# Patient Record
Sex: FEMALE | URBAN - METROPOLITAN AREA
[De-identification: names, ages, dates, MRNs, and addresses within clinical notes are randomized per-mention and may not be internally consistent; named-entity substitution may affect disease eponyms.]

---

## 2017-11-18 ENCOUNTER — HOSPITAL ENCOUNTER (EMERGENCY)
Facility: CLINIC | Age: 37
Discharge: HOME | End: 2017-11-18
Attending: EMERGENCY MEDICINE | Admitting: EMERGENCY MEDICINE
Payer: COMMERCIAL

## 2017-11-18 VITALS
OXYGEN SATURATION: 100 % | BODY MASS INDEX: 29.15 KG/M2 | SYSTOLIC BLOOD PRESSURE: 94 MMHG | TEMPERATURE: 99.2 F | RESPIRATION RATE: 14 BRPM | DIASTOLIC BLOOD PRESSURE: 59 MMHG | WEIGHT: 159.4 LBS

## 2017-11-18 DIAGNOSIS — E86.0 DEHYDRATION: ICD-10-CM

## 2017-11-18 DIAGNOSIS — R11.10 VOMITING AND DIARRHEA: ICD-10-CM

## 2017-11-18 DIAGNOSIS — R19.7 VOMITING AND DIARRHEA: ICD-10-CM

## 2017-11-18 LAB
ALBUMIN SERPL-MCNC: 4 G/DL (ref 3.4–5)
ALP SERPL-CCNC: 107 U/L (ref 40–150)
ALT SERPL W P-5'-P-CCNC: 18 U/L (ref 0–50)
ANION GAP SERPL CALCULATED.3IONS-SCNC: 8 MMOL/L (ref 3–14)
AST SERPL W P-5'-P-CCNC: 14 U/L (ref 0–45)
BASOPHILS # BLD AUTO: 0 10E9/L (ref 0–0.2)
BASOPHILS NFR BLD AUTO: 0.1 %
BILIRUB SERPL-MCNC: 0.6 MG/DL (ref 0.2–1.3)
BUN SERPL-MCNC: 7 MG/DL (ref 7–30)
C DIFF TOX B STL QL: NEGATIVE
CALCIUM SERPL-MCNC: 8.4 MG/DL (ref 8.5–10.1)
CHLORIDE SERPL-SCNC: 106 MMOL/L (ref 94–109)
CO2 SERPL-SCNC: 24 MMOL/L (ref 20–32)
CREAT SERPL-MCNC: 0.76 MG/DL (ref 0.52–1.04)
DIFFERENTIAL METHOD BLD: NORMAL
EOSINOPHIL # BLD AUTO: 0.1 10E9/L (ref 0–0.7)
EOSINOPHIL NFR BLD AUTO: 1.2 %
ERYTHROCYTE [DISTWIDTH] IN BLOOD BY AUTOMATED COUNT: 13.7 % (ref 10–15)
GFR SERPL CREATININE-BSD FRML MDRD: 86 ML/MIN/1.7M2
GLUCOSE SERPL-MCNC: 93 MG/DL (ref 70–99)
HCG SERPL QL: NEGATIVE
HCT VFR BLD AUTO: 40 % (ref 35–47)
HGB BLD-MCNC: 13.3 G/DL (ref 11.7–15.7)
IMM GRANULOCYTES # BLD: 0 10E9/L (ref 0–0.4)
IMM GRANULOCYTES NFR BLD: 0.3 %
LYMPHOCYTES # BLD AUTO: 0.9 10E9/L (ref 0.8–5.3)
LYMPHOCYTES NFR BLD AUTO: 12.4 %
MCH RBC QN AUTO: 27.1 PG (ref 26.5–33)
MCHC RBC AUTO-ENTMCNC: 33.3 G/DL (ref 31.5–36.5)
MCV RBC AUTO: 82 FL (ref 78–100)
MONOCYTES # BLD AUTO: 0.7 10E9/L (ref 0–1.3)
MONOCYTES NFR BLD AUTO: 9.1 %
NEUTROPHILS # BLD AUTO: 5.8 10E9/L (ref 1.6–8.3)
NEUTROPHILS NFR BLD AUTO: 76.9 %
PLATELET # BLD AUTO: 268 10E9/L (ref 150–450)
POTASSIUM SERPL-SCNC: 4.1 MMOL/L (ref 3.4–5.3)
PROT SERPL-MCNC: 7.4 G/DL (ref 6.8–8.8)
RBC # BLD AUTO: 4.91 10E12/L (ref 3.8–5.2)
SODIUM SERPL-SCNC: 138 MMOL/L (ref 133–144)
SPECIMEN SOURCE: NORMAL
WBC # BLD AUTO: 7.6 10E9/L (ref 4–11)

## 2017-11-18 PROCEDURE — 84703 CHORIONIC GONADOTROPIN ASSAY: CPT | Performed by: EMERGENCY MEDICINE

## 2017-11-18 PROCEDURE — 87493 C DIFF AMPLIFIED PROBE: CPT | Mod: XU | Performed by: EMERGENCY MEDICINE

## 2017-11-18 PROCEDURE — 96375 TX/PRO/DX INJ NEW DRUG ADDON: CPT

## 2017-11-18 PROCEDURE — 87506 IADNA-DNA/RNA PROBE TQ 6-11: CPT | Performed by: EMERGENCY MEDICINE

## 2017-11-18 PROCEDURE — 96374 THER/PROPH/DIAG INJ IV PUSH: CPT

## 2017-11-18 PROCEDURE — 85025 COMPLETE CBC W/AUTO DIFF WBC: CPT | Performed by: EMERGENCY MEDICINE

## 2017-11-18 PROCEDURE — 96361 HYDRATE IV INFUSION ADD-ON: CPT

## 2017-11-18 PROCEDURE — 25000128 H RX IP 250 OP 636: Performed by: EMERGENCY MEDICINE

## 2017-11-18 PROCEDURE — 99284 EMERGENCY DEPT VISIT MOD MDM: CPT | Mod: 25

## 2017-11-18 PROCEDURE — 80053 COMPREHEN METABOLIC PANEL: CPT | Performed by: EMERGENCY MEDICINE

## 2017-11-18 RX ORDER — KETOROLAC TROMETHAMINE 30 MG/ML
30 INJECTION, SOLUTION INTRAMUSCULAR; INTRAVENOUS ONCE
Status: COMPLETED | OUTPATIENT
Start: 2017-11-18 | End: 2017-11-18

## 2017-11-18 RX ORDER — ONDANSETRON 2 MG/ML
4 INJECTION INTRAMUSCULAR; INTRAVENOUS ONCE
Status: COMPLETED | OUTPATIENT
Start: 2017-11-18 | End: 2017-11-18

## 2017-11-18 RX ORDER — ONDANSETRON 4 MG/1
4 TABLET, ORALLY DISINTEGRATING ORAL EVERY 4 HOURS PRN
Qty: 10 TABLET | Refills: 0 | Status: SHIPPED | OUTPATIENT
Start: 2017-11-18

## 2017-11-18 RX ADMIN — ONDANSETRON 4 MG: 2 SOLUTION INTRAMUSCULAR; INTRAVENOUS at 15:56

## 2017-11-18 RX ADMIN — SODIUM CHLORIDE 1000 ML: 9 INJECTION, SOLUTION INTRAVENOUS at 16:44

## 2017-11-18 RX ADMIN — SODIUM CHLORIDE 1000 ML: 9 INJECTION, SOLUTION INTRAVENOUS at 15:56

## 2017-11-18 RX ADMIN — KETOROLAC TROMETHAMINE 30 MG: 30 INJECTION, SOLUTION INTRAMUSCULAR at 17:26

## 2017-11-18 ASSESSMENT — ENCOUNTER SYMPTOMS
HEADACHES: 1
VOMITING: 1
COUGH: 0
WEAKNESS: 1
NAUSEA: 1
DIARRHEA: 1
FEVER: 0

## 2017-11-18 NOTE — LETTER
November 18, 2017      To Whom It May Concern:      Nurys Soria was seen in our Emergency Department today, 11/18/17.  I expect her condition to improve over the next 3 days.  She may return to work/school when improved.  Please excuse her from work for the next 3 days.    Sincerely,        Inez Nicole MD

## 2017-11-18 NOTE — ED PROVIDER NOTES
History     Chief Complaint:    Nausea, Vomiting, & Diarrhea       HPI   Nurys Soria is a 37 year old female who presents to the ED today with nausea, vomiting, and diarrhea. The patient states that she started vomiting and having diarrhea yesterday afternoon. She reports that in the span of an hour, she had 8 episodes of diarrhea, and states that her last episode was about a half an hour ago. She states that the last time she vomited was last night. The patient denies taking any antibiotics, any recent travels, or having any concern for possible food poisoning. She states that she works at a , where some of her co-workers have been sick with similar symptoms. The patient reports that she also has a mild headache and generalized body aches, but denies any neck pain or stiffness, cough or cold symptoms, fever/chills, bloody emesis, or blood in her stool.     Allergies:  No known drug allergies.      Medications:    Iron supplement    Past Medical History:    Anemia     Past Surgical History:    Gyn surgery- Tubal ligation     Family History:    History reviewed. No pertinent family history.     Social History:  Marital Status:    Presents to the ED with daughter   Tobacco Use: never smoker   Alcohol Use: no  [facility]     Review of Systems   Constitutional: Negative for fever.   Respiratory: Negative for cough.    Gastrointestinal: Positive for diarrhea, nausea and vomiting.   Neurological: Positive for weakness and headaches.   All other systems reviewed and are negative.    Physical Exam   First Vitals:  BP: 105/61 (MAP 75)  Heart Rate: 92  Temp: 99.2  F (37.3  C)  Resp: 14  Weight: 72.3 kg (159 lb 6.4 oz) (Actual, standing)  SpO2: 99 %      Physical Exam  Nursing note and vitals reviewed.  Constitutional:  Appears well-developed and well-nourished.   HENT:   Head:    Atraumatic.   Mouth/Throat:   Oropharynx is clear but mucous membranes are dry. No  oropharyngeal exudate.   Eyes:    Pupils are equal, round, and reactive to light.   Neck:    Normal range of motion. Neck supple.      No tracheal deviation present. No thyromegaly present. No meningeal signs. She can touch her chin to her chest comfortably.   Cardiovascular:  Normal rate, regular rhythm, no murmur   Pulmonary/Chest: Breath sounds are clear and equal without wheezes or crackles.  Abdominal:   Soft. Bowel sounds are normal. Exhibits no distension and      no mass. There is no tenderness.      There is no rebound and no guarding.   Back:    No CVA tenderness.   Musculoskeletal:  Exhibits no edema.   Lymphadenopathy:  No cervical adenopathy.   Neurological:   Alert and oriented to person, place, and time. GCS 15.  CN 2-12 intact.  and proximal upper extremity strength strong and equal.  Bilateral lower extremity strength strong and equal, including strong dorsiflexion and plantarflexion strength.  Sensation intact and equal to the face, arms and legs.  No facial droop or weakness. Normal speech.  Follows commands and answers questions normally.    Skin:    Skin is warm and dry. No rash noted. No pallor.      Emergency Department Course   Laboratory:  HCG Qualitative Pregnancy (1550): Negative.   CBC:  WBC 7.6, HGB 13.3, , otherwise WNL   CMP: calcium 8.4 (L) otherwise WNL (Creatinine 0.76)   Clostridium difficile toxin B PCR: pending   Enteric bacteria and virus panel by SHELBY stool: pending     Interventions:  (1556) Normal Saline, 1 liter, IV bolus   (1556) Zofran 4 mg, IV   (1644) Normal Saline, 1 liter, IV bolus   (1736) Toradol 30 mg, IV    Emergency Department Course:  Nursing notes and vitals reviewed.  (1540) I performed an exam of the patient as documented above.    A peripheral IV was established. Blood was drawn from the patient. This was sent for laboratory testing, findings above.   Stool sample was obtained and sent for laboratory analysis, findings above.    Findings and plan  explained to the patient. Patient discharged home with instructions regarding supportive care, medications, and reasons to return. The importance of close follow-up was reviewed. The patient was prescribed Zofran.    Impression & Plan    Medical Decision Making:  Nurys Soria is a 37 year old female who presents for evaluation of nausea, vomiting and diarrhea.  There are some ill contacts at the  that she works at.  I considered a broad differential diagnosis for this patient including viral gastroenteritis, bacterial infection of the large intestine (salmonella, shigella, campylobacter, e coli, etc), bowel obstruction, intra-abdominal infection such as colitis, food poisoning, cholecystitis, UTI, pyelonephritis, appendicitis, etc.  She has a mild headache associated with the body aches, but no signs of meningitis.  There are no signs of worrisome intra-abdominal pathologies detected during the visit today.  She has a completely benign abdominal exam without rebound, guarding, or marked tenderness to palpation.  Supportive outpatient management is therefore indicated.  Abdominal pain precautions are given for home.   No indication for stool studies at this time.  No indication for CT at this time.  She passed oral challenge here in ED. It was discussed to return to the ED for blood in stool, increasing pain, or fevers more than 102.   Feels much improved after interventions in ED.      Diagnosis:    ICD-10-CM    1. Vomiting and diarrhea R11.10     R19.7    2. Dehydration E86.0        Disposition:  discharged to home    Discharge Medications:  New Prescriptions    ONDANSETRON (ZOFRAN ODT) 4 MG ODT TAB    Take 1 tablet (4 mg) by mouth every 4 hours as needed for nausea         Nayla NICOLE, am serving as a scribe on 11/18/2017 at 3:40 PM to personally document services performed by Dr. Nicole based on my observations and the provider's statements to me.   11/18/2017    EMERGENCY DEPARTMENT        Inez Nicole MD  11/18/17 3160

## 2017-11-18 NOTE — ED AVS SNAPSHOT
Emergency Department    [address] [facility] [address]    Phone:  [phone]    Fax:  [phone]                                       Nurys Soria   MRN: [id number]    Department:   Emergency Department   Date of Visit:  11/18/2017           Patient Information     Date Of Birth          1980        Your diagnoses for this visit were:     Vomiting and diarrhea     Dehydration        You were seen by Inez Nicole MD.      Follow-up Information     Follow up with Clinic, [facility].    Why:  Monday for recheck    Contact information:    [address]  [facility] [address] [phone]          Follow up with  Emergency Department.    Specialty:  EMERGENCY MEDICINE    Why:  As needed, If symptoms worsen or fevers or abdominal pain or recurrent vomiting.    Contact information:    [address] [facility] [address] [phone]      Discharge References/Attachments     DEHYDRATION (Togolese)    DEHYDRATION (ADULT) (Togolese)    VOMITING AND DIARRHEA, NONSPECIFIC (ADULT) (Togolese)    VOMITING OR DIARRHEA (ADULT), DIET FOR (Togolese)      24 Hour Appointment Hotline       To make an appointment at any [facility], call [phone] ([phone]). If you don't have a family doctor or clinic, we will help you find one. [facility] clinics are conveniently located to serve the needs of you and your family.             Review of your medicines      START taking        Dose / Directions Last dose taken    ondansetron 4 MG ODT tab   Commonly known as:  ZOFRAN ODT   Dose:  4 mg   Quantity:  10 tablet        Take 1 tablet (4 mg) by mouth every 4 hours as needed for nausea   Refills:  0          Our records show that you are taking the medicines listed below. If these are incorrect, please call your family doctor or clinic.        Dose / Directions Last dose taken    IRON SUPPLEMENT PO        Refills:  0        VITAMIN D  (CHOLECALCIFEROL) PO        Take by mouth daily   Refills:  0                Prescriptions were sent or printed at these locations (1 Prescription)                   Other Prescriptions                Printed at Department/Unit printer (1 of 1)         ondansetron (ZOFRAN ODT) 4 MG ODT tab                Procedures and tests performed during your visit     CBC with platelets + differential    Clostridium difficile toxin B PCR    Comprehensive metabolic panel    Enteric Bacteria and Virus Panel by SHELBY Stool    HCG QUALitative pregnancy (blood)      Orders Needing Specimen Collection     None      Pending Results     Date and Time Order Name Status Description    11/18/2017 1554 Clostridium difficile toxin B PCR In process     11/18/2017 1554 Enteric Bacteria and Virus Panel by SHELBY Stool In process             Pending Culture Results     Date and Time Order Name Status Description    11/18/2017 1554 Clostridium difficile toxin B PCR In process     11/18/2017 1554 Enteric Bacteria and Virus Panel by SHELBY Stool In process             Pending Results Instructions     If you had any lab results that were not finalized at the time of your Discharge, you can call the ED Lab Result RN at [phone]. You will be contacted by this team for any positive Lab results or changes in treatment. The nurses are available 7 days a week from 10A to 6:30P.  You can leave a message 24 hours per day and they will return your call.        Test Results From Your Hospital Stay        11/18/2017  3:59 PM      Component Results     Component Value Ref Range & Units Status    WBC 7.6 4.0 - 11.0 10e9/L Final    RBC Count 4.91 3.8 - 5.2 10e12/L Final    Hemoglobin 13.3 11.7 - 15.7 g/dL Final    Hematocrit 40.0 35.0 - 47.0 % Final    MCV 82 78 - 100 fl Final    MCH 27.1 26.5 - 33.0 pg Final    MCHC 33.3 31.5 - 36.5 g/dL Final    RDW 13.7 10.0 - 15.0 % Final    Platelet Count 268 150 - 450 10e9/L Final    Diff Method Automated Method  Final    %  Neutrophils 76.9 % Final    % Lymphocytes 12.4 % Final    % Monocytes 9.1 % Final    % Eosinophils 1.2 % Final    % Basophils 0.1 % Final    % Immature Granulocytes 0.3 % Final    Absolute Neutrophil 5.8 1.6 - 8.3 10e9/L Final    Absolute Lymphocytes 0.9 0.8 - 5.3 10e9/L Final    Absolute Monocytes 0.7 0.0 - 1.3 10e9/L Final    Absolute Eosinophils 0.1 0.0 - 0.7 10e9/L Final    Absolute Basophils 0.0 0.0 - 0.2 10e9/L Final    Abs Immature Granulocytes 0.0 0 - 0.4 10e9/L Final         11/18/2017  4:14 PM      Component Results     Component Value Ref Range & Units Status    Sodium 138 133 - 144 mmol/L Final    Potassium 4.1 3.4 - 5.3 mmol/L Final    Chloride 106 94 - 109 mmol/L Final    Carbon Dioxide 24 20 - 32 mmol/L Final    Anion Gap 8 3 - 14 mmol/L Final    Glucose 93 70 - 99 mg/dL Final    Urea Nitrogen 7 7 - 30 mg/dL Final    Creatinine 0.76 0.52 - 1.04 mg/dL Final    GFR Estimate 86 >60 mL/min/1.7m2 Final    Non  GFR Calc    GFR Estimate If Black >90 >60 mL/min/1.7m2 Final    African American GFR Calc    Calcium 8.4 (L) 8.5 - 10.1 mg/dL Final    Bilirubin Total 0.6 0.2 - 1.3 mg/dL Final    Albumin 4.0 3.4 - 5.0 g/dL Final    Protein Total 7.4 6.8 - 8.8 g/dL Final    Alkaline Phosphatase 107 40 - 150 U/L Final    ALT 18 0 - 50 U/L Final    AST 14 0 - 45 U/L Final         11/18/2017  4:11 PM      Component Results     Component Value Ref Range & Units Status    HCG Qualitative Serum Negative NEG^Negative Final    This test is for screening purposes.  Results should be interpreted along with   the clinical picture.  Confirmation testing is available if warranted by   ordering [id number], HCG Quantitative Pregnancy.           11/18/2017  4:14 PM         11/18/2017  4:14 PM                Clinical Quality Measure: Blood Pressure Screening     Your blood pressure was checked while you were in the emergency department today. The last reading we obtained was  BP: 90/64 . Please read the guidelines below  "about what these numbers mean and what you should do about them.  If your systolic blood pressure (the top number) is less than 120 and your diastolic blood pressure (the bottom number) is less than 80, then your blood pressure is normal. There is nothing more that you need to do about it.  If your systolic blood pressure (the top number) is 120-139 or your diastolic blood pressure (the bottom number) is 80-89, your blood pressure may be higher than it should be. You should have your blood pressure rechecked within a year by a primary care provider.  If your systolic blood pressure (the top number) is 140 or greater or your diastolic blood pressure (the bottom number) is 90 or greater, you may have high blood pressure. High blood pressure is treatable, but if left untreated over time it can put you at risk for heart attack, stroke, or kidney failure. You should have your blood pressure rechecked by a primary care provider within the next 4 weeks.  If your provider in the emergency department today gave you specific instructions to follow-up with your doctor or provider even sooner than that, you should follow that instruction and not wait for up to 4 weeks for your follow-up visit.        Thank you for choosing [facility]       Thank you for choosing [facility] for your care. Our goal is always to provide you with excellent care. Hearing back from our patients is one way we can continue to improve our services. Please take a few minutes to complete the written survey that you may receive in the mail after you visit with us. Thank you!        [employer] Information     [email or web address] lets you send messages to your doctor, view your test results, renew your prescriptions, schedule appointments and more. To sign up, go to [email or web address] . Click on \"Log in\" on the left side of the screen, which will take you to the Welcome page. Then click on \"Sign up Now\" on the right side of the page.     You will be asked to enter the " access code listed below, as well as some personal information. Please follow the directions to create your username and password.     Your access code is: [email or web address]  Expires: 2018  5:41 PM     Your access code will  in 90 days. If you need help or a new code, please call your [facility] clinic or [phone].        Care EveryWhere ID     This is your Care EveryWhere ID. This could be used by other organizations to access your [facility] medical records  [id number]        Equal Access to Services     [facility] : Ari Yuan, josephine penn, diamond coffeyalsalome beasley, venkatesh jaimes . So [facility] [phone].    ATENCIÓN: Si habla español, tiene a thompson disposición servicios gratuitos de asistencia lingüística. Llame al [phone].    We comply with applicable federal civil rights laws and [place] laws. We do not discriminate on the basis of race, color, national origin, age, disability, sex, sexual orientation, or gender identity.            After Visit Summary       This is your record. Keep this with you and show to your community pharmacist(s) and doctor(s) at your next visit.

## 2017-11-19 ENCOUNTER — TELEPHONE (OUTPATIENT)
Dept: EMERGENCY MEDICINE | Facility: CLINIC | Age: 37
End: 2017-11-19

## 2017-11-19 LAB
C COLI+JEJUNI+LARI FUSA STL QL NAA+PROBE: NOT DETECTED
EC STX1 GENE STL QL NAA+PROBE: NOT DETECTED
EC STX2 GENE STL QL NAA+PROBE: NOT DETECTED
ENTERIC PATHOGEN COMMENT: ABNORMAL
NOROV GI+II ORF1-ORF2 JNC STL QL NAA+PR: ABNORMAL
RVA NSP5 STL QL NAA+PROBE: NOT DETECTED
SALMONELLA SP RPOD STL QL NAA+PROBE: NOT DETECTED
SHIGELLA SP+EIEC IPAH STL QL NAA+PROBE: NOT DETECTED
V CHOL+PARA RFBL+TRKH+TNAA STL QL NAA+PR: NOT DETECTED
Y ENTERO RECN STL QL NAA+PROBE: NOT DETECTED

## 2017-11-19 NOTE — TELEPHONE ENCOUNTER
[facility] Emergency Department Lab result notification:    [place] ED lab result protocol used  Enteric Bacteria and Virus Panel: Norovirus    Reason for call  Notify of lab results, assess symptoms,  review ED providers recommendations/discharge instructions (if necessary) and advise per ED lab result f/u protocol    Lab Result  Final Enteric Bacteria and Virus Panel by SHELBY Stool is POSITIVE for Norovirus I and II by SHELBY  Patient to be notified, symptom's assessed and advised per [place] ED lab result f/u protocol.  Information table from ED Provider visit on 11/18/2017  Symptoms reported at ED visit (Chief complaint, HPI) a 37 year old female who presents to the ED today with nausea, vomiting, and diarrhea. The patient states that she started vomiting and having diarrhea yesterday afternoon. She reports that in the span of an hour, she had 8 episodes of diarrhea, and states that her last episode was about a half an hour ago. She states that the last time she vomited was last night. The patient denies taking any antibiotics, any recent travels, or having any concern for possible food poisoning. She states that she works at a , where some of her co-workers have been sick with similar symptoms. The patient reports that she also has a mild headache and generalized body aches, but denies any neck pain or stiffness, cough or cold symptoms, fever/chills, bloody emesis, or blood in her stool   ED providers Impression and Plan (applicable information) a 37 year old female who presents for evaluation of nausea, vomiting and diarrhea.  There are some ill contacts at the  that she works at.  I considered a broad differential diagnosis for this patient including viral gastroenteritis, bacterial infection of the large intestine (salmonella, shigella, campylobacter, e coli, etc), bowel obstruction, intra-abdominal infection such as colitis, food poisoning, cholecystitis, UTI, pyelonephritis,  appendicitis, etc.  She has a mild headache associated with the body aches, but no signs of meningitis.  There are no signs of worrisome intra-abdominal pathologies detected during the visit today.  She has a completely benign abdominal exam without rebound, guarding, or marked tenderness to palpation.  Supportive outpatient management is therefore indicated.  Abdominal pain precautions are given for home.   No indication for stool studies at this time.  No indication for CT at this time.  She passed oral challenge here in ED. It was discussed to return to the ED for blood in stool, increasing pain, or fevers more than 102.   Feels much improved after interventions in ED.    Miscellaneous information Follow up with Clinic, [facility]. Monday for a recheck     RN Assessment (Patient s current Symptoms), include time called.  [Insert Left message here if message left]  At 1114 Left voicemail message requesting a call back to [phone] between 10 a.m. and 6:30 p.m., 7 days a week for patient's ED/UC lab results.  May leave a message 24/7, if no one available.     Lizet Lloyd, RN  [place] [employer] [facility] RN  Lung Nodule and ED Lab Result F/u RN  Epic pool (ED late result f/u RN): [id number]  FV INCIDENTAL RADIOLOGY F/U NURSES: P [id number]  # [phone]      Copy of Lab result   Exam Information   Exam Date Exam Time Accession # Results    11/18/17  4:00 PM [id number]    Component Results   Component Value Flag Ref Range Units Status Collected Lab   Campylobacter group by SHELBY Not Detected  NDET^Not Detected  Final 11/18/2017  4:00 PM 75   Salmonella species by SHELBY Not Detected  NDET^Not Detected  Final 11/18/2017  4:00 PM 75   Shigella species by SHELBY Not Detected  NDET^Not Detected  Final 11/18/2017  4:00 PM 75   Vibrio group by SHELBY Not Detected  NDET^Not Detected  Final 11/18/2017  4:00 PM 75   Rotavirus A by SHELBY Not Detected  NDET^Not Detected  Final 11/18/2017  4:00 PM 75   Shiga toxin 1  gene by SHELBY Not Detected  NDET^Not Detected  Final 11/18/2017  4:00 PM 75   Shiga toxin 2 gene by SHELBY Not Detected  NDET^Not Detected  Final 11/18/2017  4:00 PM 75   Norovirus I and II by SHELBY  (A) NDET^Not Detected  Final 11/18/2017  4:00 PM 75   Detected, Abnormal Result   Yersinia enterocolitica by SHELBY Not Detected  NDET^Not Detected  Final 11/18/2017  4:00 PM 75   Enteric pathogen comment     Final 11/18/2017  4:00 PM 75

## 2017-11-20 NOTE — TELEPHONE ENCOUNTER
"[facility] Emergency Department Lab result notification     Patient/parent Name  Nurys    RN Assessment (Patient s current Symptoms), include time called.  [Insert Left message here if message left]  \"No more diarrhea.  I'm still having some stomach pain but better.  Still a little nauseous.  I'm seeing my doctor on Wednesday.\"    RN Recommendations/Instructions per [place] ED lab result protocol  Reviewed information of Norovirus per protocol.  Encouraged handwashing and maintaining hydration     Please Contact your PCP clinic or return to the Emergency department if your:    Symptoms worsen or other concerning symptom's.    PCP follow-up Questions asked: YES       [RN Name]  Tre Gillis RN  [facility] RN  Lung Nodule and ED Lab Result F/u RN  Epic pool (ED late result f/u RN): [id number]  FV INCIDENTAL RADIOLOGY F/U NURSES: P [id number]  # [phone]    "

## 2018-03-23 ENCOUNTER — HOSPITAL ENCOUNTER (EMERGENCY)
Facility: CLINIC | Age: 38
Discharge: HOME | End: 2018-03-24
Attending: EMERGENCY MEDICINE | Admitting: EMERGENCY MEDICINE
Payer: COMMERCIAL

## 2018-03-23 VITALS
TEMPERATURE: 97.9 F | SYSTOLIC BLOOD PRESSURE: 107 MMHG | HEART RATE: 69 BPM | OXYGEN SATURATION: 100 % | RESPIRATION RATE: 16 BRPM | DIASTOLIC BLOOD PRESSURE: 38 MMHG

## 2018-03-23 DIAGNOSIS — M79.602 LEFT ARM PAIN: ICD-10-CM

## 2018-03-23 PROCEDURE — 99282 EMERGENCY DEPT VISIT SF MDM: CPT

## 2018-03-23 NOTE — ED AVS SNAPSHOT
Emergency Department    [address] [place] [place] [address]    Phone:  [phone]    Fax:  [phone]                                       Nurys Soria   MRN: [id number]    Department:   Emergency Department   Date of Visit:  3/23/2018           Patient Information     Date Of Birth          1980        Your diagnoses for this visit were:     Left arm pain        You were seen by Dago Ryan MD.      Follow-up Information     Follow up with Clinic, [facility]. Schedule an appointment as soon as possible for a visit in 1 week.    Why:  For follow up    Contact information:    [address]  [facility] [address]  [phone]          Discharge Instructions         Espasmo Muscular [Muscle Spasm]  Un ESPASMO MUSCULAR (MUSCLE SPASM) es ange contracción prolongada de las fibras musculares. Puede deberse a un esfuerzo o agotamiento del músculo, a ange lesión o cambios metabólicos. Si persiste zachary cierto tiempo, el espasmo muscular causa dolor. Los espasmos musculares (muscle spasm) suelen presentarse en las piernas (en especial, por la noche en las personas mayores), en el shireen y en la espalda.  Cuidados En [place]:  1) Calor, masajes y estiramiento (elongación) pasivo le ayudarán a relajar el espasmo muscular.  2) Si el espasmo es en el brazo o la pierna, puede estirar el músculo de manera pasiva : pida a alguien que le doble o le enderece la articulación por encima o por debajo del músculo afectado hasta que sienta que ése músculo se estira. Mantenga micah tensión zachary 5 a 30 segundos, mientras le resulte tolerable. Suelte. Descanse un minuto. Repita hasta aliviar el espasmo.  3) Puede usar acetaminofén [acetaminophen] (Tylenol) o ibuprofeno [ibuprofen] (Motrin o Advil) para controlar el dolor, a menos que le hayan recetado otro medicamento. [ NOTA : Si tiene ange enfermedad hepática o renal crónica (chronic liver or kidney disease), o ha tenido  alguna vez ange úlcera estomacal (stomach ulcer) o sangrado gastrointestinal (GI bleeding), consulte con thompson médico antes de zohreh estos medicamentos.]  Programe ange VISITA DE CONTROL con thompson médico o adamaris centro si no empieza a sentirse mejor zachary los próximos 1 ó 2 días.  Busque Prontamente Atención Médica  si algo de lo siguiente ocurre:  -- Se le hinchan los dedos de los pies o las maria r, o los siente fríos o entumecidos, se gordo azulados, o siente cosquilleo en micah claudia.  -- Siente debilidad o entumecimiento en el brazo o la pierna afectados.  -- El dolor aumenta y no disminuye con las medidas detalladas más arriba.  Date Last Reviewed: 11/21/2015 2000-2017 [employer]. [address] [place] [address]. Todos los derechos reservados. Esta información no pretende sustituir la atención médica profesional. Sólo thompson médico puede diagnosticar y tratar un problema de vicky.          24 Hour Appointment Hotline       To make an appointment at any [facility] clinic, call [phone] ([phone]). If you don't have a family doctor or clinic, we will help you find one. [facility] clinics are conveniently located to serve the needs of you and your family.             Review of your medicines      START taking        Dose / Directions Last dose taken    cyclobenzaprine 10 MG tablet   Commonly known as:  FLEXERIL   Dose:  5-10 mg   Quantity:  10 tablet        Take 0.5-1 tablets (5-10 mg) by mouth 3 times daily as needed for muscle spasms (May cause drowsiness.)   Refills:  0        naproxen 500 MG tablet   Commonly known as:  NAPROSYN   Dose:  500 mg   Quantity:  16 tablet        Take 1 tablet (500 mg) by mouth 2 times daily (with meals) for 8 days   Refills:  0          Our records show that you are taking the medicines listed below. If these are incorrect, please call your family doctor or clinic.        Dose / Directions Last dose taken    IRON SUPPLEMENT PO        Refills:  0         ondansetron 4 MG ODT tab   Commonly known as:  ZOFRAN ODT   Dose:  4 mg   Quantity:  10 tablet        Take 1 tablet (4 mg) by mouth every 4 hours as needed for nausea   Refills:  0        VITAMIN D (CHOLECALCIFEROL) PO        Take by mouth daily   Refills:  0                Prescriptions were sent or printed at these locations (2 Prescriptions)                   Other Prescriptions                Printed at Department/Unit printer (2 of 2)         naproxen (NAPROSYN) 500 MG tablet               cyclobenzaprine (FLEXERIL) 10 MG tablet                Orders Needing Specimen Collection     None      Pending Results     No orders found for last 3 day(s).            Pending Culture Results     No orders found for last 3 day(s).            Pending Results Instructions     If you had any lab results that were not finalized at the time of your Discharge, you can call the ED Lab Result RN at [phone]. You will be contacted by this team for any positive Lab results or changes in treatment. The nurses are available 7 days a week from 10A to 6:30P.  You can leave a message 24 hours per day and they will return your call.        Test Results From Your Hospital Stay               Clinical Quality Measure: Blood Pressure Screening     Your blood pressure was checked while you were in the emergency department today. The last reading we obtained was  BP: (!) 107/38 . Please read the guidelines below about what these numbers mean and what you should do about them.  If your systolic blood pressure (the top number) is less than 120 and your diastolic blood pressure (the bottom number) is less than 80, then your blood pressure is normal. There is nothing more that you need to do about it.  If your systolic blood pressure (the top number) is 120-139 or your diastolic blood pressure (the bottom number) is 80-89, your blood pressure may be higher than it should be. You should have your blood pressure rechecked within a year by a  "primary care provider.  If your systolic blood pressure (the top number) is 140 or greater or your diastolic blood pressure (the bottom number) is 90 or greater, you may have high blood pressure. High blood pressure is treatable, but if left untreated over time it can put you at risk for heart attack, stroke, or kidney failure. You should have your blood pressure rechecked by a primary care provider within the next 4 weeks.  If your provider in the emergency department today gave you specific instructions to follow-up with your doctor or provider even sooner than that, you should follow that instruction and not wait for up to 4 weeks for your follow-up visit.        Thank you for choosing [facility]       Thank you for choosing [facility] for your care. Our goal is always to provide you with excellent care. Hearing back from our patients is one way we can continue to improve our services. Please take a few minutes to complete the written survey that you may receive in the mail after you visit with us. Thank you!        [facility] Information     [facility] lets you send messages to your doctor, view your test results, renew your prescriptions, schedule appointments and more. To sign up, go to [email or web address] . Click on \"Log in\" on the left side of the screen, which will take you to the Welcome page. Then click on \"Sign up Now\" on the right side of the page.     You will be asked to enter the access code listed below, as well as some personal information. Please follow the directions to create your username and password.     Your access code is: [email or web address]  Expires: 2018 12:31 AM     Your access code will  in 90 days. If you need help or a new code, please call your [facility] clinic or [phone].        Care EveryWhere ID     This is your Care EveryWhere ID. This could be used by other organizations to access your [facility] medical records  [id number]        Equal Access to Services     ABDULKADIR LEAVITT AH: " Hadii cameron Yuan, warhiannada isamar, qamilita kaalvenkatesh gill. So [facility] [phone].    ATENCIÓN: Si habla español, tiene a thompson disposición servicios gratuitos de asistencia lingüística. Llame al [phone].    We comply with applicable federal civil rights laws and [place] laws. We do not discriminate on the basis of race, color, national origin, age, disability, sex, sexual orientation, or gender identity.            After Visit Summary       This is your record. Keep this with you and show to your community pharmacist(s) and doctor(s) at your next visit.

## 2018-03-23 NOTE — ED AVS SNAPSHOT
Emergency Department    [address] [place] [address]    Phone:  [phone]    Fax:  [phone]                                       Nurys Soria   MRN: [id number]    Department:   Emergency Department   Date of Visit:  3/23/2018           After Visit Summary Signature Page     I have received my discharge instructions, and my questions have been answered. I have discussed any challenges I see with this plan with the nurse or doctor.    ..........................................................................................................................................  Patient/Patient Representative Signature      ..........................................................................................................................................  Patient Representative Print Name and Relationship to Patient    ..................................................               ................................................  Date                                            Time    ..........................................................................................................................................  Reviewed by Signature/Title    ...................................................              ..............................................  Date                                                            Time

## 2018-03-24 RX ORDER — CYCLOBENZAPRINE HCL 10 MG
5-10 TABLET ORAL 3 TIMES DAILY PRN
Qty: 10 TABLET | Refills: 0 | Status: SHIPPED | OUTPATIENT
Start: 2018-03-24

## 2018-03-24 RX ORDER — NAPROXEN 500 MG/1
500 TABLET ORAL 2 TIMES DAILY WITH MEALS
Qty: 16 TABLET | Refills: 0 | Status: SHIPPED | OUTPATIENT
Start: 2018-03-24 | End: 2018-04-01

## 2018-03-24 ASSESSMENT — ENCOUNTER SYMPTOMS
MYALGIAS: 1
NUMBNESS: 1
ARTHRALGIAS: 1
NECK PAIN: 0
WEAKNESS: 0

## 2018-03-24 NOTE — DISCHARGE INSTRUCTIONS
Espasmo Muscular [Muscle Spasm]  Un ESPASMO MUSCULAR (MUSCLE SPASM) es ange contracción prolongada de las fibras musculares. Puede deberse a un esfuerzo o agotamiento del músculo, a ange lesión o cambios metabólicos. Si persiste zachary cierto tiempo, el espasmo muscular causa dolor. Los espasmos musculares (muscle spasm) suelen presentarse en las piernas (en especial, por la noche en las personas mayores), en el shireen y en la espalda.  Cuidados En [place]:  1) Calor, masajes y estiramiento (elongación) pasivo le ayudarán a relajar el espasmo muscular.  2) Si el espasmo es en el brazo o la pierna, puede estirar el músculo de manera pasiva : pida a alguien que le doble o le enderece la articulación por encima o por debajo del músculo afectado hasta que sienta que ése músculo se estira. Mantenga micah tensión zachary 5 a 30 segundos, mientras le resulte tolerable. Suelte. Descanse un minuto. Repita hasta aliviar el espasmo.  3) Puede usar acetaminofén [acetaminophen] (Tylenol) o ibuprofeno [ibuprofen] (Motrin o Advil) para controlar el dolor, a menos que le hayan recetado otro medicamento. [ NOTA : Si tiene ange enfermedad hepática o renal crónica (chronic liver or kidney disease), o ha tenido alguna vez ange úlcera estomacal (stomach ulcer) o sangrado gastrointestinal (GI bleeding), consulte con thompson médico antes de zohreh estos medicamentos.]  Programe ange VISITA DE CONTROL con thompson médico o adamaris centro si no empieza a sentirse mejor zachary los próximos 1 ó 2 días.  Busque Prontamente Atención Médica  si algo de lo siguiente ocurre:  -- Se le hinchan los dedos de los pies o las maria r, o los siente fríos o entumecidos, se gordo azulados, o siente cosquilleo en micah claudia.  -- Siente debilidad o entumecimiento en el brazo o la pierna afectados.  -- El dolor aumenta y no disminuye con las medidas detalladas más arriba.  Date Last Reviewed: 11/21/2015 2000-2017 [employer]. [address] [place] [address], [place]  [address]. Todos los derechos reservados. Esta información no pretende sustituir la atención médica profesional. Sólo thompson médico puede diagnosticar y tratar un problema de vicky.

## 2018-03-24 NOTE — ED PROVIDER NOTES
History     Chief Complaint:  Arm Pain    History limited due to language barrier and subsequently provided by  device.    DONOVAN Soria is a right handed 37 year old female who presents to the emergency department today for evaluation of left arm pain. The patient reports an onset of intermittent burning left arm pain radiating her shoulder to her wrist for the past month with tingling in her fingers. She initially thought this pain was from sleeping on her arm. She has never felt a pain like this before. The pain has been increasingly worse with more constant pain. Additionally, she notes increasing cramping in her feet. She is concerned about the length of her pain prompting her visit to the emergency department. At arrival, she rates her pain at 8/10. She denies injury to her neck, arm, or shoulder, neck pain, weakness, rash, and pain medication use. 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 Patient denies numbness or weakness in the hand, arm or fingers. She denies shooting or radiating pain from the shoulder. She denies difficulty moving the arm or increased pain with movement. Denies fevers or recent illness. Her primary care doctor is Dr. More Cody.    Allergies:  No Known Drug Allergies    Medications:    VITAMIN D, CHOLECALCIFEROL, PO  Ferrous Sulfate (IRON SUPPLEMENT PO)  ondansetron (ZOFRAN ODT) 4 MG ODT tab    Past Medical History:    Migraines    Past Surgical History:    Tubal ligation  Cholecystectomy    Family History:    History reviewed. No pertinent family history.     Social History:  The patient was accompanied to the ED by family.  Smoking Status: Never  Smokeless Tobacco: Never  Alcohol Use: No  Marital Status:      Review of Systems   Musculoskeletal: Positive for arthralgias (left arm) and myalgias (left arm, cramping in feet). Negative for neck pain.   Skin: Negative for rash.   Neurological: Positive for numbness  (left fingers). Negative for weakness.   All other systems reviewed and are negative.    Physical Exam   First Vitals:  BP: (!) 107/38  Pulse: 69  Temp: 97.9  F (36.6  C)  Resp: 16  SpO2: 100 %    Physical Exam  General: Well appearing, nontoxic. Resting comfortably  Head:  Scalp, face, and head appear normal  Eyes:  Pupils equal, round, and reactive to light    Conjunctivae noninjected and sclera white  ENT:    The nose is normal    Ears/pinnae are normal  Neck:  Normal range of motion. Cervical spine nontender, no stepoffs. No neck soft tissue tenderness or lesions.  CV:  RRR, no M/R/G  Resp:  CTAB, no increased WOB  MSK:  LUE appears normal. No evidence for injury or trauma. No rash or lesions. Strength 5 out of 5 and intact in all distributions of the bilateral upper extremities.  Sensation intact to light touch throughout all peripheral distributions.  Finger strength is intact to AB and adduction as well as opposition.  Patient notes mild tenderness to palpation of the forearm muscles however they are soft there is no evidence of increased compartment pressures in the arm.  The joints of the arm have full range of motion both active and passive without any swelling erythema or warmth.  There is no focal bony tenderness or deformity to the arm shoulder or clavicles.  Radial pulses 2+ and symmetric bilaterally.  Capillary refill is less than 2 seconds in bilateral fingertips.  Spurling's test is negative bilaterally.  Skin:  No rash or lesions noted.  Neuro: Speech is normal and fluent    Moves all extremities spontaneously  Psych:  Awake, Alert. Normal affect      Appropriate interactions           Emergency Department Course   Emergency Department Course:  Nursing notes and vitals reviewed.  0003: I performed an exam of the patient as documented above.   Findings and plan explained to the Patient. Patient discharged home with instructions regarding supportive care, medications, and reasons to return. The  importance of close follow-up was reviewed. The patient was prescribed Flexeril and Naprosyn.  I personally answered all related questions with the patient and the family prior to discharge.    Impression & Plan    Medical Decision Making:  Nurys Soria is a 37 year old female presents with left arm pain. The symptoms are fairly atypical. She reports no injury or trauma. She reports no muscle tension, shoulder pain or neck pain. She does note intermittent cramping in her feet as well. The pain in her arm is intermittent though worse today. There is no evidence of cellulitis, septic arthritis, or other infection. She has normal sensation, circulation, and motor function in the upper extremities. Spurling test was negative for any worsening of radicular pain or radicular symptoms. The exact etiology of the patient's symptoms is unclear, although it does not appear to be related to a serious neurologic problem, infection, or injury. At this point, I will treat her symptomatically with NSAIDs as well as muscle relaxer medications. She may have irritation of the brachial plexus at the shoulder as on exam, she does have some increased muscle tension in this area. There is no evidence of compartment syndrome or deep space infection. Patient denies any history that would suggest a repetitive use injury, cubital tunnel or carpal tunnel syndrome. I encouraged the patient to follow up closely with her primary care provider if her symptoms continue as she may need physical therapy or further evaluation by orthopedics or hand specialists. Patient was agreeable to plan of care. All questions were answered. Return precautions were provided and patient was discharged in stable condition with prescription for Naproxen and Flexeril.     Diagnosis:    ICD-10-CM    1. Left arm pain M79.602        Disposition:  discharged to home    Discharge Medications:  New Prescriptions    CYCLOBENZAPRINE (FLEXERIL) 10 MG TABLET    Take 0.5-1  tablets (5-10 mg) by mouth 3 times daily as needed for muscle spasms (May cause drowsiness.)    NAPROXEN (NAPROSYN) 500 MG TABLET    Take 1 tablet (500 mg) by mouth 2 times daily (with meals) for 8 days       Scribe Disclosure:  Daljit NICOLE, am serving as a scribe at 11:50 PM on 3/23/2018 to document services personally performed by Dago Ryan MD based on my observations and the provider's statements to me.     3/23/2018    EMERGENCY DEPARTMENT       Dago Ryan MD  03/24/18 1953

## 2022-06-07 NOTE — ED AVS SNAPSHOT
Emergency Department    [address] [place] [address]    Phone:  [phone]    Fax:  [phone]                                       Nurys Soria   MRN: [id number]    Department:   Emergency Department   Date of Visit:  11/18/2017           After Visit Summary Signature Page     I have received my discharge instructions, and my questions have been answered. I have discussed any challenges I see with this plan with the nurse or doctor.    ..........................................................................................................................................  Patient/Patient Representative Signature      ..........................................................................................................................................  Patient Representative Print Name and Relationship to Patient    ..................................................               ................................................  Date                                            Time    ..........................................................................................................................................  Reviewed by Signature/Title    ...................................................              ..............................................  Date                                                            Time           PAST MEDICAL HISTORY:  DM (diabetes mellitus)     No Past Medical History     Splenic infarct

## 2024-12-04 ENCOUNTER — OFFICE VISIT (OUTPATIENT)
Dept: URGENT CARE | Facility: URGENT CARE | Age: 44
End: 2024-12-04
Payer: COMMERCIAL

## 2024-12-04 VITALS
HEART RATE: 92 BPM | BODY MASS INDEX: 31.46 KG/M2 | TEMPERATURE: 98.5 F | DIASTOLIC BLOOD PRESSURE: 66 MMHG | SYSTOLIC BLOOD PRESSURE: 108 MMHG | OXYGEN SATURATION: 98 % | WEIGHT: 172 LBS

## 2024-12-04 DIAGNOSIS — R10.9 FLANK PAIN: Primary | ICD-10-CM

## 2024-12-04 DIAGNOSIS — B34.9 VIRAL ILLNESS: ICD-10-CM

## 2024-12-04 DIAGNOSIS — N30.00 ACUTE CYSTITIS WITHOUT HEMATURIA: ICD-10-CM

## 2024-12-04 DIAGNOSIS — R11.0 NAUSEA: ICD-10-CM

## 2024-12-04 LAB
ALBUMIN UR-MCNC: NEGATIVE MG/DL
AMORPH CRY #/AREA URNS HPF: ABNORMAL /HPF
APPEARANCE UR: ABNORMAL
BACTERIA #/AREA URNS HPF: ABNORMAL /HPF
BILIRUB UR QL STRIP: NEGATIVE
COLOR UR AUTO: YELLOW
FLUAV AG SPEC QL IA: NEGATIVE
FLUBV AG SPEC QL IA: NEGATIVE
GLUCOSE UR STRIP-MCNC: NEGATIVE MG/DL
HGB UR QL STRIP: NEGATIVE
KETONES UR STRIP-MCNC: NEGATIVE MG/DL
LEUKOCYTE ESTERASE UR QL STRIP: ABNORMAL
NITRATE UR QL: POSITIVE
PH UR STRIP: 8 [PH] (ref 5–7)
RBC #/AREA URNS AUTO: ABNORMAL /HPF
SARS-COV-2 RNA RESP QL NAA+PROBE: NEGATIVE
SP GR UR STRIP: 1.02 (ref 1–1.03)
SQUAMOUS #/AREA URNS AUTO: ABNORMAL /LPF
UROBILINOGEN UR STRIP-ACNC: 0.2 E.U./DL
WBC #/AREA URNS AUTO: ABNORMAL /HPF

## 2024-12-04 PROCEDURE — 87635 SARS-COV-2 COVID-19 AMP PRB: CPT | Performed by: PHYSICIAN ASSISTANT

## 2024-12-04 PROCEDURE — 99204 OFFICE O/P NEW MOD 45 MIN: CPT | Performed by: PHYSICIAN ASSISTANT

## 2024-12-04 PROCEDURE — 81001 URINALYSIS AUTO W/SCOPE: CPT | Performed by: PHYSICIAN ASSISTANT

## 2024-12-04 PROCEDURE — 87804 INFLUENZA ASSAY W/OPTIC: CPT | Performed by: PHYSICIAN ASSISTANT

## 2024-12-04 RX ORDER — ONDANSETRON 8 MG/1
8 TABLET, ORALLY DISINTEGRATING ORAL EVERY 8 HOURS PRN
Qty: 21 TABLET | Refills: 0 | Status: SHIPPED | OUTPATIENT
Start: 2024-12-04

## 2024-12-04 RX ORDER — CEFDINIR 300 MG/1
300 CAPSULE ORAL 2 TIMES DAILY
Qty: 14 CAPSULE | Refills: 0 | Status: SHIPPED | OUTPATIENT
Start: 2024-12-04 | End: 2024-12-11

## 2024-12-04 NOTE — PROGRESS NOTES
Flank pain  - UA Macroscopic with reflex to Microscopic and Culture - Clinic Collect  - Urine Microscopic Exam  - Urine Culture    Viral illness  - Influenza A & B Antigen - Clinic Collect  - COVID-19 Virus (Coronavirus) by PCR Nasopharyngeal      General Tips for All Ages:    Rest and Hydration:  Allow yourself the time to rest and prioritize hydration.  Stay well-hydrated with water, clear broths, and soothing herbal teas.    Symptomatic Relief:  Over-the-counter (OTC) medications can help alleviate symptoms.  Consider non-pharmacological options like a warm saltwater gargle for soothing a sore throat.    For Infants and Children (Under 2 Years):    Nasal Saline Drops:  Use saline drops to clear nasal congestion in infants.  Administer 1-2 drops in each nostril before feeding or bedtime.    Humidifier:  Place a cool-mist humidifier in the room to ease congestion.  Remember to clean the humidifier regularly.  Okay to use [employer]     Acetaminophen (if applicable):  Use acetaminophen for fever and discomfort.  Follow dosing guidelines based on your child's weight.  Avoid aspirin in children to prevent Reye's syndrome.    Contact Pediatrician:  If symptoms persist or worsen, or if your child shows signs of respiratory distress, contact your pediatrician.    For Children (2-12 Years):    Nasal Saline Solution:  Encourage the use of saline nasal spray for congestion relief.  Teach your child to blow their nose gently.    Honey (for those over 1 year):  Use honey to soothe a cough (1-2 teaspoons as needed).  Do not give honey to children under 1 year due to the risk of botulism.    Acetaminophen or Ibuprofen:  Use acetaminophen or ibuprofen for fever and pain relief.  Follow dosing guidelines based on your child's weight.    Fluid Intake:  Ensure your child drinks warm liquids like herbal teas and broths.  Monitor their fluid intake to keep them hydrated.    For Adolescents and Adults (12 Years and  Older):    Decongestants (Pseudoephedrine/Phenylephrine):  Consider OTC decongestants for nasal congestion.  Use with caution if you have hypertension, and avoid prolonged use.    Cough Suppressants (Dextromethorphan):  Choose a cough suppressant for persistent cough.  Avoid in children under 12 years; use honey instead.  Follow package instructions and avoid multiple medications with similar ingredients.    Pain Relievers (Acetaminophen or Ibuprofen):  Use acetaminophen or ibuprofen for pain and fever.  Follow package instructions.  Take ibuprofen with food to minimize stomach upset.    Rest and Isolation:  Prioritize rest and stay at home to prevent spreading the infection.    For All Ages:    Hydration:  Ensure you stay well-hydrated; monitor urine color to gauge hydration.    Hand Hygiene:  Wash hands with soap and water for at least 20 seconds.  Use hand  with at least 60% alcohol if soap is unavailable.    Avoid Tobacco Smoke:  Stay away from tobacco smoke, which can worsen respiratory symptoms.    Seek Medical Attention:  If symptoms worsen or if you experience difficulty breathing, seek medical attention promptly.  Look out for red flag symptoms like persistent high fever, severe headache, or chest pain.    Patient advised to return to clinic for reevaluation (either UC or PCP) if symptoms do not improve in 7 days. Patient educated on red flag symptoms and asked to go directly to the ED if these symptoms present themselves.     Remember, this guide is meant to assist you, but individual cases may vary. If you have concerns or if symptoms persist, don't hesitate to reach out to a healthcare professional. Wishing you a speedy recovery!      Acute cystitis without hematuria  - cefdinir (OMNICEF) 300 MG capsule; Take 1 capsule (300 mg) by mouth 2 times daily for 7 days.    General Tips for All Ages:    Hydration:  Why: Drinking plenty of water helps flush out bacteria from the urinary system.  What to  Do: Aim for at least 8 glasses of water per day.    Cranberry Juice:  Why: Some studies suggest cranberry juice may help prevent bacterial adherence in the urinary tract.  What to Do: Drink pure, unsweetened cranberry juice. Limit added sugars.    For Adults (18 Years and Older):    Over-the-Counter (OTC) Pain Relievers:  Why: Relieves discomfort and pain.  What to Use: Ibuprofen or acetaminophen as directed on the package.    Urinary Alkalinizers (if prescribed):  Why: Alters the acidity of the urine, providing relief.  What to Do: Take as prescribed by your healthcare provider.    Antibiotics (if prescribed):  Why: Eliminates the bacterial infection.  What to Do: Take the full course of antibiotics as directed, even if symptoms improve.    For Adolescents (12-17 Years):    OTC Pain Relievers:  Why: Manages pain and discomfort.  What to Use: Ibuprofen or acetaminophen following package instructions.    Hydration:  Why: Helps flush out bacteria.  What to Do: Drink plenty of water; avoid sugary drinks.    Seek Medical Attention:  When: If symptoms persist or worsen.  What to Do: Contact your healthcare provider for further evaluation.    For Children (Under 12 Years):    Hydration:  Why: Promotes urinary system flushing.  What to Do: Encourage your child to drink water regularly.    Avoid Irritants:  Why: Certain soaps or bubble baths can worsen symptoms.  What to Do: Use gentle, fragrance-free products for bathing.    OTC Pain Relievers (if approved by pediatrician):  Why: Eases pain and discomfort.  What to Use: Follow your pediatrician's advice on using ibuprofen or acetaminophen.    Seek Medical Attention:  When: If symptoms persist or if your child has a high fever.  What to Do: Contact your child's pediatrician for guidance.    All Ages:    Probiotics:  Why: May help restore healthy bacteria in the gut.  What to Do: Consider including probiotic-rich foods or supplements.    Avoid Irritants:  Why: Certain foods  and drinks can worsen symptoms.  What to Do: Limit caffeine, spicy foods, and alcohol during the infection.    Follow-up:    Patient advised to return to clinic for reevaluation (either UC or PCP) if symptoms do not improve in 7 days. Patient educated on red flag symptoms and asked to go directly to the ED if these symptoms present themselves.     Remember, individual cases may vary, and it's crucial to follow your healthcare provider's advice. If you have concerns or if symptoms persist, don't hesitate to reach out for further guidance.    Wishing you a armas recovery!    Nausea  - ondansetron (ZOFRAN ODT) 8 MG ODT tab; Take 1 tablet (8 mg) by mouth every 8 hours as needed for nausea.    [facility]    Subjective   44 year old who presents to clinic today for the following health issues:    Urgent Care       HPI     Acute Illness  Acute illness concerns: Headache, mid back pain nausea, and fever  Onset/Duration: Sun  Symptoms:  Fever: Feels feverish   Chills/Sweats: YES  Headache (location?): YES- Seems to be left sided and in the back of the head   Sinus Pressure: No  Conjunctivitis:  No  Ear Pain: Right sided ear pain   Rhinorrhea: YES  Congestion: No  Sore Throat: No  Cough: no  Wheeze: No  Decreased Appetite: YES  Nausea: YES  Vomiting: Monday morning   Diarrhea: No  Dysuria/Freq.: No  Dysuria or Hematuria: No  Fatigue/Achiness: Fatigue   Sick/Strep Exposure: None known   Therapies tried and outcome: Ibuprofen     Review of Systems   Review of Systems   See HPI    Objective    Temp: 98.5  F (36.9  C) Temp src: Tympanic BP: 108/66 Pulse: 92     SpO2: 98 %       Physical Exam   Physical Exam  Constitutional:       General: She is not in acute distress.     Appearance: Normal appearance. She is normal weight. She is not ill-appearing, toxic-appearing or diaphoretic.   HENT:      Head: Normocephalic and atraumatic.      Right Ear: Tympanic membrane, ear canal and external  ear normal. There is no impacted cerumen.      Left Ear: Tympanic membrane, ear canal and external ear normal. There is no impacted cerumen.      Nose: Congestion and rhinorrhea present.      Mouth/Throat:      Mouth: Mucous membranes are moist.      Pharynx: No oropharyngeal exudate or posterior oropharyngeal erythema.   Cardiovascular:      Rate and Rhythm: Normal rate and regular rhythm.      Pulses: Normal pulses.      Heart sounds: Normal heart sounds. No murmur heard.     No friction rub. No gallop.   Pulmonary:      Effort: Pulmonary effort is normal. No respiratory distress.      Breath sounds: No stridor. No wheezing, rhonchi or rales.   Chest:      Chest wall: No tenderness.   Lymphadenopathy:      Cervical: No cervical adenopathy.   Neurological:      General: No focal deficit present.      Mental Status: She is alert and oriented to person, place, and time. Mental status is at baseline.      Gait: Gait normal.   Psychiatric:         Mood and Affect: Mood normal.         Behavior: Behavior normal.         Thought Content: Thought content normal.         Judgment: Judgment normal.          Results for orders placed or performed in visit on 12/04/24 (from the past 24 hours)   Influenza A & B Antigen - Clinic Collect    Specimen: Nose; Swab   Result Value Ref Range    Influenza A antigen Negative Negative    Influenza B antigen Negative Negative    Narrative    Test results must be correlated with clinical data. If necessary, results should be confirmed by a molecular assay or viral culture.   UA Macroscopic with reflex to Microscopic and Culture - Clinic Collect    Specimen: Urine, Clean Catch   Result Value Ref Range    Color Urine Yellow Colorless, Straw, Light Yellow, Yellow    Appearance Urine Cloudy (A) Clear    Glucose Urine Negative Negative mg/dL    Bilirubin Urine Negative Negative    Ketones Urine Negative Negative mg/dL    Specific Gravity Urine 1.020 1.003 - 1.035    Blood Urine Negative  Negative    pH Urine 8.0 (H) 5.0 - 7.0    Protein Albumin Urine Negative Negative mg/dL    Urobilinogen Urine 0.2 0.2, 1.0 E.U./dL    Nitrite Urine Positive (A) Negative    Leukocyte Esterase Urine Trace (A) Negative   Urine Microscopic Exam   Result Value Ref Range    Bacteria Urine Many (A) None Seen /HPF    RBC Urine 0-2 0-2 /HPF /HPF    WBC Urine 0-5 0-5 /HPF /HPF    Squamous Epithelials Urine Many (A) None Seen /LPF    Amorphous Crystals Urine Few (A) None Seen /HPF

## 2024-12-04 NOTE — LETTER
December 4, 2024      Nurys Soria  [address]  [facility] [address]        To Whom It May Concern:    Nurys Soria  was seen on 12/4.  Please excuse her for her absence since Monday and her absence tomorrow due to illness.        Sincerely,        Holden Avila PA-C

## 2024-12-05 ENCOUNTER — HOSPITAL ENCOUNTER (EMERGENCY)
Facility: CLINIC | Age: 44
End: 2024-12-05
Attending: EMERGENCY MEDICINE
Payer: COMMERCIAL

## 2024-12-05 VITALS
HEART RATE: 95 BPM | TEMPERATURE: 98.4 F | DIASTOLIC BLOOD PRESSURE: 84 MMHG | OXYGEN SATURATION: 98 % | SYSTOLIC BLOOD PRESSURE: 118 MMHG | RESPIRATION RATE: 16 BRPM

## 2024-12-05 DIAGNOSIS — M54.81 OCCIPITAL NEURALGIA OF RIGHT SIDE: ICD-10-CM

## 2024-12-05 LAB
ALBUMIN SERPL BCG-MCNC: 4.2 G/DL (ref 3.5–5.2)
ALP SERPL-CCNC: 101 U/L (ref 40–150)
ALT SERPL W P-5'-P-CCNC: 16 U/L (ref 0–50)
ANION GAP SERPL CALCULATED.3IONS-SCNC: 10 MMOL/L (ref 7–15)
AST SERPL W P-5'-P-CCNC: 16 U/L (ref 0–45)
BACTERIA UR CULT: ABNORMAL
BASOPHILS # BLD AUTO: 0 10E3/UL (ref 0–0.2)
BASOPHILS NFR BLD AUTO: 1 %
BILIRUB SERPL-MCNC: 0.7 MG/DL
BUN SERPL-MCNC: 9 MG/DL (ref 6–20)
CALCIUM SERPL-MCNC: 9.3 MG/DL (ref 8.8–10.4)
CHLORIDE SERPL-SCNC: 103 MMOL/L (ref 98–107)
CREAT SERPL-MCNC: 0.88 MG/DL (ref 0.51–0.95)
EGFRCR SERPLBLD CKD-EPI 2021: 84 ML/MIN/1.73M2
EOSINOPHIL # BLD AUTO: 0.2 10E3/UL (ref 0–0.7)
EOSINOPHIL NFR BLD AUTO: 2 %
ERYTHROCYTE [DISTWIDTH] IN BLOOD BY AUTOMATED COUNT: 13 % (ref 10–15)
GLUCOSE SERPL-MCNC: 122 MG/DL (ref 70–99)
HCO3 SERPL-SCNC: 26 MMOL/L (ref 22–29)
HCT VFR BLD AUTO: 42.9 % (ref 35–47)
HGB BLD-MCNC: 14.4 G/DL (ref 11.7–15.7)
IMM GRANULOCYTES # BLD: 0.1 10E3/UL
IMM GRANULOCYTES NFR BLD: 1 %
LYMPHOCYTES # BLD AUTO: 1.9 10E3/UL (ref 0.8–5.3)
LYMPHOCYTES NFR BLD AUTO: 23 %
MCH RBC QN AUTO: 27.7 PG (ref 26.5–33)
MCHC RBC AUTO-ENTMCNC: 33.6 G/DL (ref 31.5–36.5)
MCV RBC AUTO: 83 FL (ref 78–100)
MONOCYTES # BLD AUTO: 0.7 10E3/UL (ref 0–1.3)
MONOCYTES NFR BLD AUTO: 9 %
NEUTROPHILS # BLD AUTO: 5.4 10E3/UL (ref 1.6–8.3)
NEUTROPHILS NFR BLD AUTO: 65 %
NRBC # BLD AUTO: 0 10E3/UL
NRBC BLD AUTO-RTO: 0 /100
PLATELET # BLD AUTO: 300 10E3/UL (ref 150–450)
POTASSIUM SERPL-SCNC: 3.8 MMOL/L (ref 3.4–5.3)
PROT SERPL-MCNC: 7.2 G/DL (ref 6.4–8.3)
RBC # BLD AUTO: 5.19 10E6/UL (ref 3.8–5.2)
SODIUM SERPL-SCNC: 139 MMOL/L (ref 135–145)
WBC # BLD AUTO: 8.3 10E3/UL (ref 4–11)

## 2024-12-05 PROCEDURE — 36415 COLL VENOUS BLD VENIPUNCTURE: CPT | Performed by: STUDENT IN AN ORGANIZED HEALTH CARE EDUCATION/TRAINING PROGRAM

## 2024-12-05 PROCEDURE — 82040 ASSAY OF SERUM ALBUMIN: CPT | Performed by: EMERGENCY MEDICINE

## 2024-12-05 PROCEDURE — 80053 COMPREHEN METABOLIC PANEL: CPT | Performed by: EMERGENCY MEDICINE

## 2024-12-05 PROCEDURE — 85004 AUTOMATED DIFF WBC COUNT: CPT | Performed by: EMERGENCY MEDICINE

## 2024-12-05 PROCEDURE — 258N000003 HC RX IP 258 OP 636: Performed by: EMERGENCY MEDICINE

## 2024-12-05 PROCEDURE — 64405 NJX AA&/STRD GR OCPL NRV: CPT

## 2024-12-05 PROCEDURE — 96374 THER/PROPH/DIAG INJ IV PUSH: CPT

## 2024-12-05 PROCEDURE — 250N000011 HC RX IP 250 OP 636: Performed by: EMERGENCY MEDICINE

## 2024-12-05 PROCEDURE — 99285 EMERGENCY DEPT VISIT HI MDM: CPT | Mod: 25

## 2024-12-05 PROCEDURE — 96375 TX/PRO/DX INJ NEW DRUG ADDON: CPT

## 2024-12-05 RX ORDER — DEXAMETHASONE SODIUM PHOSPHATE 10 MG/ML
10 INJECTION, SOLUTION INTRAMUSCULAR; INTRAVENOUS ONCE
Status: COMPLETED | OUTPATIENT
Start: 2024-12-05 | End: 2024-12-06

## 2024-12-05 RX ORDER — KETOROLAC TROMETHAMINE 15 MG/ML
15 INJECTION, SOLUTION INTRAMUSCULAR; INTRAVENOUS ONCE
Status: COMPLETED | OUTPATIENT
Start: 2024-12-05 | End: 2024-12-05

## 2024-12-05 RX ADMIN — METOCLOPRAMIDE 10 MG: 5 INJECTION, SOLUTION INTRAMUSCULAR; INTRAVENOUS at 22:17

## 2024-12-05 RX ADMIN — KETOROLAC TROMETHAMINE 15 MG: 15 INJECTION, SOLUTION INTRAMUSCULAR; INTRAVENOUS at 22:17

## 2024-12-05 ASSESSMENT — COLUMBIA-SUICIDE SEVERITY RATING SCALE - C-SSRS
6. HAVE YOU EVER DONE ANYTHING, STARTED TO DO ANYTHING, OR PREPARED TO DO ANYTHING TO END YOUR LIFE?: NO
1. IN THE PAST MONTH, HAVE YOU WISHED YOU WERE DEAD OR WISHED YOU COULD GO TO SLEEP AND NOT WAKE UP?: NO
2. HAVE YOU ACTUALLY HAD ANY THOUGHTS OF KILLING YOURSELF IN THE PAST MONTH?: NO

## 2024-12-05 ASSESSMENT — ACTIVITIES OF DAILY LIVING (ADL)
ADLS_ACUITY_SCORE: 41

## 2024-12-06 ENCOUNTER — APPOINTMENT (OUTPATIENT)
Dept: CT IMAGING | Facility: CLINIC | Age: 44
End: 2024-12-06
Attending: EMERGENCY MEDICINE
Payer: COMMERCIAL

## 2024-12-06 VITALS
HEART RATE: 95 BPM | DIASTOLIC BLOOD PRESSURE: 68 MMHG | SYSTOLIC BLOOD PRESSURE: 92 MMHG | RESPIRATION RATE: 16 BRPM | OXYGEN SATURATION: 95 % | TEMPERATURE: 98.4 F

## 2024-12-06 PROCEDURE — 250N000011 HC RX IP 250 OP 636: Performed by: EMERGENCY MEDICINE

## 2024-12-06 PROCEDURE — 70450 CT HEAD/BRAIN W/O DYE: CPT

## 2024-12-06 PROCEDURE — 96375 TX/PRO/DX INJ NEW DRUG ADDON: CPT

## 2024-12-06 RX ORDER — HALOPERIDOL 5 MG/ML
2 INJECTION INTRAMUSCULAR ONCE
Status: COMPLETED | OUTPATIENT
Start: 2024-12-06 | End: 2024-12-06

## 2024-12-06 RX ADMIN — DEXAMETHASONE SODIUM PHOSPHATE 10 MG: 10 INJECTION, SOLUTION INTRAMUSCULAR; INTRAVENOUS at 00:09

## 2024-12-06 RX ADMIN — HALOPERIDOL LACTATE 2 MG: 5 INJECTION, SOLUTION INTRAMUSCULAR at 01:14

## 2024-12-06 ASSESSMENT — ACTIVITIES OF DAILY LIVING (ADL)
ADLS_ACUITY_SCORE: 41
ADLS_ACUITY_SCORE: 41

## 2024-12-06 NOTE — ED TRIAGE NOTES
Pt arrives via triage presenting with headache for 5 days. Hx of migraines. Endorses nausea     Triage Assessment (Adult)       Row Name 12/05/24 2047          Triage Assessment    Airway WDL WDL        Respiratory WDL    Respiratory WDL WDL        Cardiac WDL    Cardiac WDL WDL        Peripheral/Neurovascular WDL    Peripheral Neurovascular WDL WDL        Cognitive/Neuro/Behavioral WDL    Cognitive/Neuro/Behavioral WDL X  headache

## 2024-12-06 NOTE — ED PROVIDER NOTES
"  Emergency Department Note      History of Present Illness     Chief Complaint   Headache      HPI   Nurys Soria is a 44 year old female with history of migraine who presents to the ED with headache.  Symptoms started 5 days ago.  This is abnormal for her, and is lasting longer than her typical migraine.  Been trying Excedrin to help with symptoms without relief.  She describes that the headache is located mostly to the right side of her head, but sometimes in the front.  It is worse when she lies on her side.  The light also makes it worse.  She states that she had a subjective fever 5 days ago.  That resolved 4 days ago.  She was seen in urgent care 2 days ago, and was diagnosed with a \"light UTI\" and was started on antibiotics.  She also had a flu and COVID test.  She was told the flu is negative, but does not know the result of her COVID test.  She is feeling nauseated, but denies any vomiting.  She denies any vision changes, focal numbness or weakness.  She does have some pain in her right upper arm, but thinks that this is because she has been laying on her arm.  She feels that this headache is different than her typical migraine because it is lasted longer and feels stronger.  She describes it as a pressure-like pain.  States that she was diagnosed with migraine about 14 years ago.    Independent Historian   None    Review of External Notes   ***    Past Medical History     Medical History and Problem List   No past medical history on file.    Medications   cefdinir (OMNICEF) 300 MG capsule  cyclobenzaprine (FLEXERIL) 10 MG tablet  Ferrous Sulfate (IRON SUPPLEMENT PO)  ondansetron (ZOFRAN ODT) 4 MG ODT tab  ondansetron (ZOFRAN ODT) 8 MG ODT tab  VITAMIN D, CHOLECALCIFEROL, PO        Surgical History   Past Surgical History:   Procedure Laterality Date    GYN SURGERY      tubaligation\       Physical Exam     Patient Vitals for the past 24 hrs:   BP Temp Pulse Resp SpO2   12/05/24 2108 -- -- -- 16 -- "   12/05/24 2048 118/84 98.4  F (36.9  C) 95 (!) 146 98 %     Physical Exam  General: alert, lying comfortably on gurney  HENT: mucous membranes moist  Neck: FROM without pain.  CV: regular rate, regular rhythm  Resp: normal effort, clear throughout, no crackles or wheezing  GI: abdomen soft and nontender, no guarding  MSK: no bony tenderness  Skin: appropriately warm and dry  Extremities: no edema, calves non-tender  Neuro: GCS 15.     Speech is fluent, cognition is normal.     EOMI, symmetric grimace.     Str 5/5 in RUE, LUE, RLE, LLE.     Fine touch sensation intact in BUE/BLE.   No pronator drift.    Psych: normal mood and affect    Diagnostics     Lab Results   Labs Ordered and Resulted from Time of ED Arrival to Time of ED Departure   COMPREHENSIVE METABOLIC PANEL - Abnormal       Result Value    Sodium 139      Potassium 3.8      Carbon Dioxide (CO2) 26      Anion Gap 10      Urea Nitrogen 9.0      Creatinine 0.88      GFR Estimate 84      Calcium 9.3      Chloride 103      Glucose 122 (*)     Alkaline Phosphatase 101      AST 16      ALT 16      Protein Total 7.2      Albumin 4.2      Bilirubin Total 0.7     CBC WITH PLATELETS AND DIFFERENTIAL    WBC Count 8.3      RBC Count 5.19      Hemoglobin 14.4      Hematocrit 42.9      MCV 83      MCH 27.7      MCHC 33.6      RDW 13.0      Platelet Count 300      % Neutrophils 65      % Lymphocytes 23      % Monocytes 9      % Eosinophils 2      % Basophils 1      % Immature Granulocytes 1      NRBCs per 100 WBC 0      Absolute Neutrophils 5.4      Absolute Lymphocytes 1.9      Absolute Monocytes 0.7      Absolute Eosinophils 0.2      Absolute Basophils 0.0      Absolute Immature Granulocytes 0.1      Absolute NRBCs 0.0         Imaging   No orders to display       EKG   ECG taken at ***, ECG read at ***  ***   *** as compared to prior, dated ***/***/***.  Rate *** bpm. WY interval *** ms. QRS duration *** ms. QT/QTc ***/*** ms. P-R-T axes *** *** ***.    Independent  "Interpretation   {IndependentReview:669836::\"None\"}    ED Course      Medications Administered   Medications - No data to display    Procedures   Procedures     Discussion of Management   {Consults/Care Discussions:943270::\"None\"}    ED Course   ED Course as of 12/05/24 2138   Thu Dec 05, 2024   2138 I obtained history and examined the patient as noted above.        Additional Documentation  {EPPAAdditionalPhrase:275240::\"None\"}    Medical Decision Making / Diagnosis     CMS Diagnoses: {Sepsis/Septic Shock/Stemi/Stroke:688980::\"None\"}    MIPS       {EPPA MIPS:645669::\"None\"}    TIKA Soria is a 44 year old female ***    Disposition   {EPPAFV Dispo:141902}    Diagnosis   No diagnosis found.     Discharge Medications   New Prescriptions    No medications on file         Scribe Disclosure:  I, William Mendieta, am serving as a scribe at 9:38 PM on 12/5/2024 to document services personally performed by Christine Arthur MD based on my observations and the provider's statements to me.     " in the distribution of the occipital nerve.  I offered and she excepted a occipital nerve block.  Fortunately, this seems to have helped her symptoms a great deal, and she describes nearly complete resolution of her symptoms.  Otherwise, workup in the ED is reassuring as well.  CT of the head was obtained when I cannot control her symptoms.  However, as noted above, I think symptoms are related to occipital neuralgia.  Labs are unremarkable.  I recommended close follow-up with PCP to discuss diagnosis further.  Return to the ED for recurrent headache, new symptoms such as confusion, focal numbness or weakness, fever, or for any other concerns.    Disposition   The patient was discharged.     Diagnosis     ICD-10-CM    1. Occipital neuralgia of right side  M54.81            Discharge Medications   New Prescriptions    No medications on file         Scribe Disclosure:  I, William Mendieta, am serving as a scribe at 9:38 PM on 12/5/2024 to document services personally performed by Christine Arthur MD based on my observations and the provider's statements to me.        Christine Arthur MD  12/07/24 0319

## 2024-12-06 NOTE — DISCHARGE INSTRUCTIONS
Neuralgia occipital   Alguna vez tuvo un dolor de vinay intenso en la parte posterior de la vinay? Podría ser un trastorno de dolor de vinay poco frecuente conocido vern neuralgia occipital. Ghazal dolor de vinay ocurre cuando el dolor proviene de la región occipital o de la parte posterior de la vinay y se extiende a través de los nervios occipitales. Los nervios occipitales se encuentran en la parte superior de la médula bates y llegan hasta el cuero cabelludo. Controlan la vinay y el shireen.    La neuralgia occipital puede ser un trastorno primario o secundario, lo que significa que puede ser ange afección en sí misma o un síntoma de aneg enfermedad subyacente.    Hay varias opciones de tratamiento disponibles para ayudarlo a controlar con éxito los síntomas y el dolor de la neuralgia occipital, especialmente si se trata la causa subyacente.    Si cyndi la neuralgia occipital no pone en peligro la franny, es dolorosa.     Cómo se siente la neuralgia occipital?  La imagen muestra la parte del cráneo que afecta un dolor de vinay por neuralgia occipital, en la parte posterior de la vinay.  El dolor de la neuralgia occipital se siente en la parte posterior de la vinay, en los nervios occipitales.  La neuralgia occipital puede sentirse vern un dolor jordy y punzante en la parte posterior de la vinay, generalmente en un lado. Puede describirse vern ange sensación punzante o de ardor. También puede causar sensibilidad a la jeet, sensibilidad en el cuero cabelludo y dolor detrás del vipul en el lado afectado. También puede sentir dolor en la base del cráneo o detrás de la oreja.    A diferencia de otros kavin de vinay o migrañas, que poco a poco se vuelven más dolorosos, la neuralgia occipital puede desencadenarse rápidamente y con algo tan simple vern cepillarse el nabil. En la neuralgia occipital, el dolor intenso es breve, dura solo unos segundos o un par de minutos y luego desaparece, mientras que el dolor de  la migraña también es intenso, gisell dura mucho más.    Es poco probable que los kavin de vinay por neuralgia occipital presenten síntomas vern párpados caídos, enrojecimiento de los ojos u ojos llorosos. Estos síntomas son comunes con otros trastornos de dolor de vinay, vern migrañas o hemicránea continua.     Qué causa la neuralgia occipital?  La neuralgia occipital comúnmente es causada por nervios comprimidos en la raíz del shireen o porque los músculos del shireen están demasiado tensos. En algunos casos, puede deberse a ange lesión en la vinay o el shireen.    Sin embargo, dado que la neuralgia occipital también se considera un trastorno de dolor de vinay secundario, puede ser un síntoma de otra afección. Las afecciones que pueden provocar o empeorar la neuralgia occipital incluyen:    Artrosis, especialmente de la columna cervical superior, que puede pinzar los nervios  Tumores que crecen en las raíces nerviosas  MAV (malformación arteriovenosa)  Aneurisma cerebral  Tumor cerebral  Meningitis  Trastornos del sueño  Ataque cerebral  Sinusitis (inflamación de los senos paranasales)  Neuralgia trigeminal  Artritis reumatoide  Gota  Infección   Cómo se diagnostica la neuralgia occipital?  El proveedor allan le hará un examen físico completo. Le preguntará sobre thompson historia clínica y cuánto tiempo ha experimentado síntomas, o si ha tenido dolor en la parte posterior de la vinay. Ange forma en que el médico puede comprobar si siente dolor es aplicando presión en la parte posterior de la vinay.    Para detectar ange causa subyacente o descartar otras afecciones, el proveedor puede pedir pruebas por imágenes, vern ange resonancia magnética o ange tomografía computarizada. Mangum le ayudará a observar mejor la columna.     Cómo se elimina el dolor de la neuralgia occipital?  En la mayoría de los casos, el médico le recomendará tratamientos caseros, vern aplicar un paño tibio en las áreas donde siente dolor y zohreh  medicamentos antiinflamatorios, vern ibuprofeno o naproxeno. Si no obtiene alivio con esos tratamientos, el médico puede recetarle relajantes musculares para aliviar la tensión muscular alrededor de los nervios.    Si estos métodos más conservadores no funcionan, el médico puede inyectarle un anestésico local en el área occipital (la parte posterior de la vinay). Ghazal tratamiento puede proporcionar un alivio inmediato y durar varias horas.    Para ciertos trastornos de dolor de vinay, vern la neuralgia occipital, un bloqueo del nervio occipital puede ofrecer un alivio temporal. Sin embargo, es importante tener en cuenta que la eficacia de los bloqueos nerviosos puede según la persona. Hable sobre los bloqueos nerviosos con el médico para determinar si esta opción de tratamiento es adecuada para usted.    En casos poco frecuentes, el médico puede recomendar ange cirugía para reducir la presión sobre los nervios afectados, especialmente si la presión es causada por ange afección subyacente, vern artritis reumatoide o artrosis.    Reciba atención  Le ayudamos a vivir cyndi. Podemos ayudarle en persona o en línea.

## 2024-12-07 ENCOUNTER — APPOINTMENT (OUTPATIENT)
Dept: CT IMAGING | Facility: CLINIC | Age: 44
DRG: 075 | End: 2024-12-07
Attending: PHYSICIAN ASSISTANT
Payer: COMMERCIAL

## 2024-12-07 ENCOUNTER — HOSPITAL ENCOUNTER (INPATIENT)
Facility: CLINIC | Age: 44
DRG: 075 | End: 2024-12-07
Attending: PHYSICIAN ASSISTANT | Admitting: INTERNAL MEDICINE
Payer: COMMERCIAL

## 2024-12-07 DIAGNOSIS — R93.0 ABNORMAL CT OF THE HEAD: ICD-10-CM

## 2024-12-07 DIAGNOSIS — A87.9 VIRAL MENINGITIS: Primary | ICD-10-CM

## 2024-12-07 DIAGNOSIS — R51.9 INTRACTABLE HEADACHE, UNSPECIFIED CHRONICITY PATTERN, UNSPECIFIED HEADACHE TYPE: ICD-10-CM

## 2024-12-07 LAB
ANION GAP SERPL CALCULATED.3IONS-SCNC: 13 MMOL/L (ref 7–15)
BASOPHILS # BLD AUTO: 0 10E3/UL (ref 0–0.2)
BASOPHILS NFR BLD AUTO: 0 %
BUN SERPL-MCNC: 13.6 MG/DL (ref 6–20)
CALCIUM SERPL-MCNC: 9.1 MG/DL (ref 8.8–10.4)
CHLORIDE SERPL-SCNC: 103 MMOL/L (ref 98–107)
CREAT SERPL-MCNC: 0.86 MG/DL (ref 0.51–0.95)
EGFRCR SERPLBLD CKD-EPI 2021: 86 ML/MIN/1.73M2
EOSINOPHIL # BLD AUTO: 0.1 10E3/UL (ref 0–0.7)
EOSINOPHIL NFR BLD AUTO: 1 %
ERYTHROCYTE [DISTWIDTH] IN BLOOD BY AUTOMATED COUNT: 13.2 % (ref 10–15)
FLUAV RNA SPEC QL NAA+PROBE: NEGATIVE
FLUBV RNA RESP QL NAA+PROBE: NEGATIVE
GLUCOSE SERPL-MCNC: 109 MG/DL (ref 70–99)
HCG SERPL QL: NEGATIVE
HCO3 SERPL-SCNC: 24 MMOL/L (ref 22–29)
HCT VFR BLD AUTO: 42.7 % (ref 35–47)
HGB BLD-MCNC: 13.8 G/DL (ref 11.7–15.7)
HOLD SPECIMEN: NORMAL
IMM GRANULOCYTES # BLD: 0.1 10E3/UL
IMM GRANULOCYTES NFR BLD: 1 %
LYMPHOCYTES # BLD AUTO: 1.7 10E3/UL (ref 0.8–5.3)
LYMPHOCYTES NFR BLD AUTO: 16 %
MCH RBC QN AUTO: 26.8 PG (ref 26.5–33)
MCHC RBC AUTO-ENTMCNC: 32.3 G/DL (ref 31.5–36.5)
MCV RBC AUTO: 83 FL (ref 78–100)
MONOCYTES # BLD AUTO: 0.6 10E3/UL (ref 0–1.3)
MONOCYTES NFR BLD AUTO: 6 %
NEUTROPHILS # BLD AUTO: 7.9 10E3/UL (ref 1.6–8.3)
NEUTROPHILS NFR BLD AUTO: 76 %
NRBC # BLD AUTO: 0 10E3/UL
NRBC BLD AUTO-RTO: 0 /100
PLATELET # BLD AUTO: 291 10E3/UL (ref 150–450)
POTASSIUM SERPL-SCNC: 3.9 MMOL/L (ref 3.4–5.3)
RBC # BLD AUTO: 5.14 10E6/UL (ref 3.8–5.2)
RSV RNA SPEC NAA+PROBE: NEGATIVE
SARS-COV-2 RNA RESP QL NAA+PROBE: NEGATIVE
SODIUM SERPL-SCNC: 140 MMOL/L (ref 135–145)
WBC # BLD AUTO: 10.3 10E3/UL (ref 4–11)

## 2024-12-07 PROCEDURE — 70496 CT ANGIOGRAPHY HEAD: CPT | Mod: XS

## 2024-12-07 PROCEDURE — 99223 1ST HOSP IP/OBS HIGH 75: CPT | Performed by: STUDENT IN AN ORGANIZED HEALTH CARE EDUCATION/TRAINING PROGRAM

## 2024-12-07 PROCEDURE — 96375 TX/PRO/DX INJ NEW DRUG ADDON: CPT

## 2024-12-07 PROCEDURE — 84703 CHORIONIC GONADOTROPIN ASSAY: CPT | Performed by: PHYSICIAN ASSISTANT

## 2024-12-07 PROCEDURE — 70450 CT HEAD/BRAIN W/O DYE: CPT

## 2024-12-07 PROCEDURE — 96365 THER/PROPH/DIAG IV INF INIT: CPT

## 2024-12-07 PROCEDURE — 85004 AUTOMATED DIFF WBC COUNT: CPT | Performed by: PHYSICIAN ASSISTANT

## 2024-12-07 PROCEDURE — 96361 HYDRATE IV INFUSION ADD-ON: CPT

## 2024-12-07 PROCEDURE — 36415 COLL VENOUS BLD VENIPUNCTURE: CPT | Performed by: PHYSICIAN ASSISTANT

## 2024-12-07 PROCEDURE — 80048 BASIC METABOLIC PNL TOTAL CA: CPT | Performed by: PHYSICIAN ASSISTANT

## 2024-12-07 PROCEDURE — 85048 AUTOMATED LEUKOCYTE COUNT: CPT | Performed by: PHYSICIAN ASSISTANT

## 2024-12-07 PROCEDURE — 87637 SARSCOV2&INF A&B&RSV AMP PRB: CPT | Performed by: PHYSICIAN ASSISTANT

## 2024-12-07 PROCEDURE — 99285 EMERGENCY DEPT VISIT HI MDM: CPT | Mod: 25

## 2024-12-07 PROCEDURE — 258N000003 HC RX IP 258 OP 636: Performed by: PHYSICIAN ASSISTANT

## 2024-12-07 PROCEDURE — 70496 CT ANGIOGRAPHY HEAD: CPT

## 2024-12-07 PROCEDURE — 250N000011 HC RX IP 250 OP 636: Performed by: PHYSICIAN ASSISTANT

## 2024-12-07 PROCEDURE — 96374 THER/PROPH/DIAG INJ IV PUSH: CPT | Mod: 59

## 2024-12-07 PROCEDURE — 258N000003 HC RX IP 258 OP 636: Performed by: STUDENT IN AN ORGANIZED HEALTH CARE EDUCATION/TRAINING PROGRAM

## 2024-12-07 PROCEDURE — 250N000013 HC RX MED GY IP 250 OP 250 PS 637: Performed by: STUDENT IN AN ORGANIZED HEALTH CARE EDUCATION/TRAINING PROGRAM

## 2024-12-07 PROCEDURE — G0378 HOSPITAL OBSERVATION PER HR: HCPCS

## 2024-12-07 PROCEDURE — 250N000009 HC RX 250: Performed by: PHYSICIAN ASSISTANT

## 2024-12-07 PROCEDURE — 82374 ASSAY BLOOD CARBON DIOXIDE: CPT | Performed by: PHYSICIAN ASSISTANT

## 2024-12-07 RX ORDER — LORAZEPAM 0.5 MG/1
0.5 TABLET ORAL EVERY 4 HOURS PRN
Status: COMPLETED | OUTPATIENT
Start: 2024-12-07 | End: 2024-12-09

## 2024-12-07 RX ORDER — DEXAMETHASONE SODIUM PHOSPHATE 10 MG/ML
10 INJECTION, SOLUTION INTRAMUSCULAR; INTRAVENOUS ONCE
Status: COMPLETED | OUTPATIENT
Start: 2024-12-07 | End: 2024-12-07

## 2024-12-07 RX ORDER — DIPHENHYDRAMINE HYDROCHLORIDE 50 MG/ML
25 INJECTION INTRAMUSCULAR; INTRAVENOUS ONCE
Status: COMPLETED | OUTPATIENT
Start: 2024-12-07 | End: 2024-12-07

## 2024-12-07 RX ORDER — MAGNESIUM SULFATE HEPTAHYDRATE 40 MG/ML
2 INJECTION, SOLUTION INTRAVENOUS ONCE
Status: COMPLETED | OUTPATIENT
Start: 2024-12-07 | End: 2024-12-07

## 2024-12-07 RX ORDER — IOPAMIDOL 755 MG/ML
67 INJECTION, SOLUTION INTRAVASCULAR ONCE
Status: COMPLETED | OUTPATIENT
Start: 2024-12-07 | End: 2024-12-07

## 2024-12-07 RX ORDER — CEFDINIR 300 MG/1
300 CAPSULE ORAL 2 TIMES DAILY
Status: DISCONTINUED | OUTPATIENT
Start: 2024-12-08 | End: 2024-12-07

## 2024-12-07 RX ORDER — PROCHLORPERAZINE MALEATE 10 MG
10 TABLET ORAL EVERY 6 HOURS PRN
Status: DISCONTINUED | OUTPATIENT
Start: 2024-12-07 | End: 2024-12-14 | Stop reason: HOSPADM

## 2024-12-07 RX ORDER — ONDANSETRON 2 MG/ML
4 INJECTION INTRAMUSCULAR; INTRAVENOUS EVERY 6 HOURS PRN
Status: DISCONTINUED | OUTPATIENT
Start: 2024-12-07 | End: 2024-12-14 | Stop reason: HOSPADM

## 2024-12-07 RX ORDER — KETOROLAC TROMETHAMINE 15 MG/ML
15 INJECTION, SOLUTION INTRAMUSCULAR; INTRAVENOUS ONCE
Status: COMPLETED | OUTPATIENT
Start: 2024-12-07 | End: 2024-12-07

## 2024-12-07 RX ORDER — KETOROLAC TROMETHAMINE 15 MG/ML
15 INJECTION, SOLUTION INTRAMUSCULAR; INTRAVENOUS EVERY 6 HOURS
Status: DISPENSED | OUTPATIENT
Start: 2024-12-07 | End: 2024-12-08

## 2024-12-07 RX ORDER — SODIUM CHLORIDE, SODIUM LACTATE, POTASSIUM CHLORIDE, CALCIUM CHLORIDE 600; 310; 30; 20 MG/100ML; MG/100ML; MG/100ML; MG/100ML
INJECTION, SOLUTION INTRAVENOUS CONTINUOUS
Status: ACTIVE | OUTPATIENT
Start: 2024-12-07 | End: 2024-12-08

## 2024-12-07 RX ORDER — ONDANSETRON 4 MG/1
4 TABLET, ORALLY DISINTEGRATING ORAL EVERY 6 HOURS PRN
Status: DISCONTINUED | OUTPATIENT
Start: 2024-12-07 | End: 2024-12-14 | Stop reason: HOSPADM

## 2024-12-07 RX ORDER — METOCLOPRAMIDE HYDROCHLORIDE 5 MG/ML
10 INJECTION INTRAMUSCULAR; INTRAVENOUS ONCE
Status: COMPLETED | OUTPATIENT
Start: 2024-12-07 | End: 2024-12-07

## 2024-12-07 RX ORDER — SUMATRIPTAN 50 MG/1
50 TABLET, FILM COATED ORAL ONCE
Status: COMPLETED | OUTPATIENT
Start: 2024-12-07 | End: 2024-12-07

## 2024-12-07 RX ORDER — CEFDINIR 300 MG/1
300 CAPSULE ORAL 2 TIMES DAILY
Status: COMPLETED | OUTPATIENT
Start: 2024-12-07 | End: 2024-12-10

## 2024-12-07 RX ADMIN — KETOROLAC TROMETHAMINE 15 MG: 15 INJECTION, SOLUTION INTRAMUSCULAR; INTRAVENOUS at 19:32

## 2024-12-07 RX ADMIN — DIPHENHYDRAMINE HYDROCHLORIDE 25 MG: 50 INJECTION, SOLUTION INTRAMUSCULAR; INTRAVENOUS at 17:36

## 2024-12-07 RX ADMIN — SUMATRIPTAN SUCCINATE 50 MG: 50 TABLET ORAL at 23:36

## 2024-12-07 RX ADMIN — SODIUM CHLORIDE 100 ML: 9 INJECTION, SOLUTION INTRAVENOUS at 18:16

## 2024-12-07 RX ADMIN — SODIUM CHLORIDE, POTASSIUM CHLORIDE, SODIUM LACTATE AND CALCIUM CHLORIDE: 600; 310; 30; 20 INJECTION, SOLUTION INTRAVENOUS at 22:15

## 2024-12-07 RX ADMIN — MAGNESIUM SULFATE HEPTAHYDRATE 2 G: 40 INJECTION, SOLUTION INTRAVENOUS at 20:58

## 2024-12-07 RX ADMIN — DEXAMETHASONE SODIUM PHOSPHATE 10 MG: 10 INJECTION, SOLUTION INTRAMUSCULAR; INTRAVENOUS at 17:36

## 2024-12-07 RX ADMIN — SODIUM CHLORIDE 1000 ML: 9 INJECTION, SOLUTION INTRAVENOUS at 20:58

## 2024-12-07 RX ADMIN — METOCLOPRAMIDE 10 MG: 5 INJECTION, SOLUTION INTRAMUSCULAR; INTRAVENOUS at 17:37

## 2024-12-07 RX ADMIN — IOPAMIDOL 67 ML: 755 INJECTION, SOLUTION INTRAVENOUS at 18:15

## 2024-12-07 ASSESSMENT — ACTIVITIES OF DAILY LIVING (ADL)
ADLS_ACUITY_SCORE: 41

## 2024-12-07 ASSESSMENT — COLUMBIA-SUICIDE SEVERITY RATING SCALE - C-SSRS
1. IN THE PAST MONTH, HAVE YOU WISHED YOU WERE DEAD OR WISHED YOU COULD GO TO SLEEP AND NOT WAKE UP?: NO
6. HAVE YOU EVER DONE ANYTHING, STARTED TO DO ANYTHING, OR PREPARED TO DO ANYTHING TO END YOUR LIFE?: NO
2. HAVE YOU ACTUALLY HAD ANY THOUGHTS OF KILLING YOURSELF IN THE PAST MONTH?: NO

## 2024-12-07 NOTE — ED TRIAGE NOTES
Patient here  with right ear pain and a headche which started last sunday     Triage Assessment (Adult)       Row Name 12/07/24 1525          Triage Assessment    Airway WDL WDL        Respiratory WDL    Respiratory WDL WDL        Skin Circulation/Temperature WDL    Skin Circulation/Temperature WDL WDL        Cardiac WDL    Cardiac WDL WDL        Peripheral/Neurovascular WDL    Peripheral Neurovascular WDL WDL        Cognitive/Neuro/Behavioral WDL    Cognitive/Neuro/Behavioral WDL WDL

## 2024-12-07 NOTE — ED PROVIDER NOTES
Emergency Department Note      History of Present Illness     Chief Complaint   Otalgia    History provided by the patient, translated from Mongolian by a professional interpretor.    DONOVAN Soria is a 44 year old female with a history of migraines who presents to the ED for otalgia. The patient reports that her right sided headache has not improved since her last ED visit 3 days ago, and she has now developed pain in her right ear. She states that she has pain all over her head and face now. Symptoms started a week ago and have been constant.  She endorses nausea and neck pain. Denies any vomiting, fever, neck stiffness, rash, immune compromise, numbness/weakness in her upper/lower extremities, vision changes, ringing in her ears, or nasal congestion. Further denies anyone in her household with similar symptoms. Patient states that she is on Excedrin for her existing history of migraines, but has not taken this, or any other medication today. She has no history of blood clots.    Independent Historian   Family members present report patient has not been acting confused.    Review of External Notes   I reviewed Dr. Christine Arthur's ED provider note from 12/5/24. C/o headache. Non-con head ct negative except possible empty sella.  Occipital nerve block w/good relief.  I reviewed Holden Avila's UC note from 12/4/24. Treated for a UTI w/Cefdinir cx growing e.coli sensitive to 3rd gen cephalosporins    Past Medical History     Medical History and Problem List   Migraine  Cholecystitis, acute  Genital HSV  GERD  Hallux valgus  Helicobacter pylori infection  Iron deficiency anemia  Plantar fascial fibromatosis  Recurrent UTI in pregnancy  Vitamin D deficiency    Medications   Omnicef  Zofran     Surgical History   Past Surgical History:   Procedure Laterality Date     GYN SURGERY      tubaligation\       Physical Exam     Patient Vitals for the past 24 hrs:   BP Temp Temp src Pulse Resp SpO2   12/07/24 2100  (!) 102/90 -- -- 70 -- 96 %   12/07/24 2000 109/74 -- -- 76 -- 97 %   12/07/24 1900 104/70 -- -- 74 16 100 %   12/07/24 1830 110/75 -- -- 77 -- 100 %   12/07/24 1800 106/71 -- -- 79 -- 98 %   12/07/24 1522 133/83 98.4  F (36.9  C) Oral 87 14 96 %     Physical Exam  General: Awake, alert, non-toxic. Uncomfortable appearing.  Head:  Scalp is NC/AT  Eyes:  Conjunctiva normal, PERRL  ENT:  The external nose and ears are normal. TM's and mastoids and external ears normal BL.  No swelling of pinna/helical structures.  Oropharynx clear.  Uvula midline no hypertrophy.   Neck:  Normal range of motion without rigidity.  CV:  Regular rate and rhythm    No pathologic murmur, rubs, or gallops.  Resp:  Breath sounds are clear bilaterally    Non-labored, no retractions or accessory muscle use  Abdomen: Abdomen is soft, no distension, no tenderness, no masses, no CVA ttp  MS:  No lower extremity edema/swelling. No midline cervical, thoracic, or lumbar tenderness.  Extremities without joint swelling or redness.  Skin:  Warm and dry, No rash or lesions noted.  Neuro:  Alert and oriented. GCS 15 5/5 strength BL to all joints of upper and lower extremities.  Normal and symmetric sensation to touch BL in arms, legs, and face.  CNII-XII intact.  Normal finger to nose testing BL. Gait normal.  Psych: Awake. Alert. Normal affect. Appropriate interactions.      Diagnostics     Lab Results   Labs Ordered and Resulted from Time of ED Arrival to Time of ED Departure   BASIC METABOLIC PANEL - Abnormal       Result Value    Sodium 140      Potassium 3.9      Chloride 103      Carbon Dioxide (CO2) 24      Anion Gap 13      Urea Nitrogen 13.6      Creatinine 0.86      GFR Estimate 86      Calcium 9.1      Glucose 109 (*)    HCG QUALITATIVE PREGNANCY - Normal    hCG Serum Qualitative Negative     INFLUENZA A/B, RSV AND SARS-COV2 PCR - Normal    Influenza A PCR Negative      Influenza B PCR Negative      RSV PCR Negative      SARS CoV2 PCR Negative      CBC WITH PLATELETS AND DIFFERENTIAL    WBC Count 10.3      RBC Count 5.14      Hemoglobin 13.8      Hematocrit 42.7      MCV 83      MCH 26.8      MCHC 32.3      RDW 13.2      Platelet Count 291      % Neutrophils 76      % Lymphocytes 16      % Monocytes 6      % Eosinophils 1      % Basophils 0      % Immature Granulocytes 1      NRBCs per 100 WBC 0      Absolute Neutrophils 7.9      Absolute Lymphocytes 1.7      Absolute Monocytes 0.6      Absolute Eosinophils 0.1      Absolute Basophils 0.0      Absolute Immature Granulocytes 0.1      Absolute NRBCs 0.0         Imaging   Head CT w/o contrast   Final Result   IMPRESSION:   1.  No acute process.      CTV Head with Contrast   Final Result   IMPRESSION:    HEAD CT:   1.  No acute process.      2.  Empty sella.      HEAD CTV:    1.  No evidence for dural sinus thrombosis.      2.  Arachnoid granulation in the mid left transverse sinus.      3.  Mild to moderate stenosis of the distal right transverse sinus.      CTA Head Neck with Contrast   Final Result   IMPRESSION:    HEAD CT:   1.  No acute process.      HEAD CTA:    1.  Normal CTA [place].      NECK CTA:   1.  Normal neck CTA.      MR Brain w/o & w Contrast    (Results Pending)     Independent Interpretation   I independently reviewed the imaging. Non-contrast head ct w/out bleed, skull fracture or mass.    ED Course      Medications Administered   Medications   ondansetron (ZOFRAN ODT) ODT tab 4 mg (has no administration in time range)     Or   ondansetron (ZOFRAN) injection 4 mg (has no administration in time range)   prochlorperazine (COMPAZINE) injection 10 mg (has no administration in time range)     Or   prochlorperazine (COMPAZINE) tablet 10 mg (has no administration in time range)   cefdinir (OMNICEF) capsule 300 mg (300 mg Oral Not Given 12/7/24 2211)   SUMAtriptan (IMITREX) tablet 50 mg (has no administration in time range)   lactated ringers infusion (0 mLs Intravenous Stopped 12/7/24  2243)   LORazepam (ATIVAN) tablet 0.5 mg (has no administration in time range)   ketorolac (TORADOL) injection 15 mg (has no administration in time range)   metoclopramide (REGLAN) injection 10 mg (10 mg Intravenous $Given 12/7/24 1737)   diphenhydrAMINE (BENADRYL) injection 25 mg (25 mg Intravenous $Given 12/7/24 1736)   dexAMETHasone PF (DECADRON) injection 10 mg (10 mg Intravenous $Given 12/7/24 1736)   Saline (100 mLs As instructed $Given 12/7/24 1816)   iopamidol (ISOVUE-370) solution 67 mL (67 mLs Intravenous $Given 12/7/24 1815)   ketorolac (TORADOL) injection 15 mg (15 mg Intravenous $Given 12/7/24 1932)   magnesium sulfate 2 g in 50 mL sterile water intermittent infusion (0 g Intravenous Stopped 12/7/24 2156)   sodium chloride 0.9% BOLUS 1,000 mL (0 mLs Intravenous Stopped 12/7/24 2156)       Procedures   Procedures     Discussion of Management   Admitting Hospitalist, Dr. Wilson who agrees to accept the pt for observation  Respoke with Dr. Wilson over the phone.  Still accepts patient, but requests MRI of the brain which is ordered.  ED Course   ED Course as of 12/07/24 2255   Sat Dec 07, 2024   1641 I obtained history and examined the patient as noted above.       I re-checked the patient.  No improvement in pain.  Will admit for observation possible neurology consultation.  Additional Documentation  None    Medical Decision Making / Diagnosis     CMS Diagnoses: None    MIPS       None    MDM   Nurysarias Soria is a 44 year old female with a history of migraines, recent UTI on cefdinir who presents for evaluation of ongoing constant headache worse on the right side and associated right ear pain for the last week.  Broad differential considered.  No evidence of otitis media, otitis externa, mastoiditis, Esthela Tavares, Bell's palsy or other external process visualized likewise no signs of deep space infection of the head or neck.  No signs of sinusitis on imaging no vision changes or evidence to suggest  angle-closure glaucoma eye infection optic neuritis etc.  In light of this now being patient's third visit and pain in the area of ear with no clear origin persistent symptoms did opt to obtain CTV/CTA of head and neck as no prior vascular imaging that shows no evidence of aneurysm dissection or occlusion, and no cerebral venous thrombus. The patient has no fever nuchal rigidity rash, immunocompromise, or altered mental status symptoms for a week I do not suspect meningitis/CNS infection or feel LP warranted at this time. Neurologic exam is normal no findings to suggest, stroke.  No mass or bleeding seen on repeat head CT again shown his empty sella which could be due to idiopathic intracranial hypertension though patient is not having any signs of acutely increased ICP no vomiting tinnitus, vision changes pressure and heart rate normal no indication for emergent LP or treatment.  Also some incidental findings of unclear significance mild to moderate narrowing of one of the venous sinuses and arachnoid granulation.  Will defer to neurology any additional workup or follow-up on this but nothing acute.  No one around pt w/similar symptoms nothing to suggest CO poisoning.  Not pregnant basic labs reassuring.  Suspect due to intractable migraine however given absence of improvement after multiple rounds of medications here we will bring her in for observation and possible neurology consult.  Discussed with hospitalist who agrees to accept.  Did request MRI of the brain to be ordered which was ordered and pending.  Will tell night physician about this if patient still in the ER to watch for result otherwise follow-up per hospitalist team.    Disposition   The patient was admitted to the hospital.     Diagnosis     ICD-10-CM    1. Intractable headache, unspecified chronicity pattern, unspecified headache type  R51.9       2. Abnormal CT of the head  R93.0     non-specific, empty Sella, venous narrowing, Arachnoid  granulation           Discharge Medications   New Prescriptions    No medications on file         Scribe Disclosure:  I, Valerie Sun, am serving as a scribe at 5:06 PM on 12/7/2024 to document services personally performed by Satnam Ellis PA-C based on my observations and the provider's statements to me.        Satnam Ellis PA-C  12/07/24 [id number]

## 2024-12-08 ENCOUNTER — APPOINTMENT (OUTPATIENT)
Dept: MRI IMAGING | Facility: CLINIC | Age: 44
DRG: 075 | End: 2024-12-08
Attending: PHYSICIAN ASSISTANT
Payer: COMMERCIAL

## 2024-12-08 ENCOUNTER — APPOINTMENT (OUTPATIENT)
Dept: MRI IMAGING | Facility: CLINIC | Age: 44
DRG: 075 | End: 2024-12-08
Attending: PSYCHIATRY & NEUROLOGY
Payer: COMMERCIAL

## 2024-12-08 LAB
ALBUMIN SERPL BCG-MCNC: 3.6 G/DL (ref 3.5–5.2)
ALP SERPL-CCNC: 81 U/L (ref 40–150)
ALT SERPL W P-5'-P-CCNC: 12 U/L (ref 0–50)
ANION GAP SERPL CALCULATED.3IONS-SCNC: 8 MMOL/L (ref 7–15)
AST SERPL W P-5'-P-CCNC: 16 U/L (ref 0–45)
ATRIAL RATE - MUSE: 81 BPM
BILIRUB DIRECT SERPL-MCNC: <0.2 MG/DL (ref 0–0.3)
BILIRUB SERPL-MCNC: 0.4 MG/DL
BUN SERPL-MCNC: 8 MG/DL (ref 6–20)
CALCIUM SERPL-MCNC: 8.5 MG/DL (ref 8.8–10.4)
CHLORIDE SERPL-SCNC: 108 MMOL/L (ref 98–107)
CREAT SERPL-MCNC: 0.63 MG/DL (ref 0.51–0.95)
DIASTOLIC BLOOD PRESSURE - MUSE: NORMAL MMHG
EGFRCR SERPLBLD CKD-EPI 2021: >90 ML/MIN/1.73M2
ERYTHROCYTE [DISTWIDTH] IN BLOOD BY AUTOMATED COUNT: 13 % (ref 10–15)
GLUCOSE SERPL-MCNC: 107 MG/DL (ref 70–99)
HCO3 SERPL-SCNC: 22 MMOL/L (ref 22–29)
HCT VFR BLD AUTO: 38.6 % (ref 35–47)
HGB BLD-MCNC: 13.1 G/DL (ref 11.7–15.7)
INTERPRETATION ECG - MUSE: NORMAL
MCH RBC QN AUTO: 27.8 PG (ref 26.5–33)
MCHC RBC AUTO-ENTMCNC: 33.9 G/DL (ref 31.5–36.5)
MCV RBC AUTO: 82 FL (ref 78–100)
P AXIS - MUSE: 59 DEGREES
PLATELET # BLD AUTO: 277 10E3/UL (ref 150–450)
POTASSIUM SERPL-SCNC: 4.1 MMOL/L (ref 3.4–5.3)
PR INTERVAL - MUSE: 138 MS
PROT SERPL-MCNC: 6 G/DL (ref 6.4–8.3)
QRS DURATION - MUSE: 74 MS
QT - MUSE: 368 MS
QTC - MUSE: 427 MS
R AXIS - MUSE: 31 DEGREES
RBC # BLD AUTO: 4.72 10E6/UL (ref 3.8–5.2)
SODIUM SERPL-SCNC: 138 MMOL/L (ref 135–145)
SYSTOLIC BLOOD PRESSURE - MUSE: NORMAL MMHG
T AXIS - MUSE: 6 DEGREES
VENTRICULAR RATE- MUSE: 81 BPM
WBC # BLD AUTO: 10.8 10E3/UL (ref 4–11)

## 2024-12-08 PROCEDURE — 70553 MRI BRAIN STEM W/O & W/DYE: CPT

## 2024-12-08 PROCEDURE — 70546 MR ANGIOGRAPH HEAD W/O&W/DYE: CPT

## 2024-12-08 PROCEDURE — G0378 HOSPITAL OBSERVATION PER HR: HCPCS

## 2024-12-08 PROCEDURE — 255N000002 HC RX 255 OP 636: Performed by: PSYCHIATRY & NEUROLOGY

## 2024-12-08 PROCEDURE — 258N000003 HC RX IP 258 OP 636: Performed by: STUDENT IN AN ORGANIZED HEALTH CARE EDUCATION/TRAINING PROGRAM

## 2024-12-08 PROCEDURE — 93010 ELECTROCARDIOGRAM REPORT: CPT | Performed by: INTERNAL MEDICINE

## 2024-12-08 PROCEDURE — 99232 SBSQ HOSP IP/OBS MODERATE 35: CPT | Performed by: INTERNAL MEDICINE

## 2024-12-08 PROCEDURE — A9585 GADOBUTROL INJECTION: HCPCS | Performed by: PSYCHIATRY & NEUROLOGY

## 2024-12-08 PROCEDURE — 36415 COLL VENOUS BLD VENIPUNCTURE: CPT | Performed by: STUDENT IN AN ORGANIZED HEALTH CARE EDUCATION/TRAINING PROGRAM

## 2024-12-08 PROCEDURE — 80048 BASIC METABOLIC PNL TOTAL CA: CPT | Performed by: STUDENT IN AN ORGANIZED HEALTH CARE EDUCATION/TRAINING PROGRAM

## 2024-12-08 PROCEDURE — A9585 GADOBUTROL INJECTION: HCPCS | Performed by: PHYSICIAN ASSISTANT

## 2024-12-08 PROCEDURE — 255N000002 HC RX 255 OP 636: Performed by: PHYSICIAN ASSISTANT

## 2024-12-08 PROCEDURE — 96361 HYDRATE IV INFUSION ADD-ON: CPT

## 2024-12-08 PROCEDURE — 82248 BILIRUBIN DIRECT: CPT | Performed by: STUDENT IN AN ORGANIZED HEALTH CARE EDUCATION/TRAINING PROGRAM

## 2024-12-08 PROCEDURE — 85027 COMPLETE CBC AUTOMATED: CPT | Performed by: STUDENT IN AN ORGANIZED HEALTH CARE EDUCATION/TRAINING PROGRAM

## 2024-12-08 PROCEDURE — 93005 ELECTROCARDIOGRAM TRACING: CPT

## 2024-12-08 PROCEDURE — 250N000011 HC RX IP 250 OP 636: Performed by: STUDENT IN AN ORGANIZED HEALTH CARE EDUCATION/TRAINING PROGRAM

## 2024-12-08 PROCEDURE — 93005 ELECTROCARDIOGRAM TRACING: CPT | Mod: 76

## 2024-12-08 PROCEDURE — 85014 HEMATOCRIT: CPT | Performed by: STUDENT IN AN ORGANIZED HEALTH CARE EDUCATION/TRAINING PROGRAM

## 2024-12-08 PROCEDURE — 96376 TX/PRO/DX INJ SAME DRUG ADON: CPT

## 2024-12-08 RX ORDER — GADOBUTROL 604.72 MG/ML
7 INJECTION INTRAVENOUS ONCE
Status: COMPLETED | OUTPATIENT
Start: 2024-12-08 | End: 2024-12-08

## 2024-12-08 RX ORDER — AMOXICILLIN 250 MG
1 CAPSULE ORAL 2 TIMES DAILY PRN
Status: DISCONTINUED | OUTPATIENT
Start: 2024-12-08 | End: 2024-12-14 | Stop reason: HOSPADM

## 2024-12-08 RX ORDER — GADOBUTROL 604.72 MG/ML
10 INJECTION INTRAVENOUS ONCE
Status: COMPLETED | OUTPATIENT
Start: 2024-12-08 | End: 2024-12-08

## 2024-12-08 RX ORDER — AMOXICILLIN 250 MG
2 CAPSULE ORAL 2 TIMES DAILY PRN
Status: DISCONTINUED | OUTPATIENT
Start: 2024-12-08 | End: 2024-12-14 | Stop reason: HOSPADM

## 2024-12-08 RX ADMIN — CEFDINIR 300 MG: 300 CAPSULE ORAL at 09:15

## 2024-12-08 RX ADMIN — GADOBUTROL 7 ML: 604.72 INJECTION INTRAVENOUS at 01:12

## 2024-12-08 RX ADMIN — CEFDINIR 300 MG: 300 CAPSULE ORAL at 20:16

## 2024-12-08 RX ADMIN — KETOROLAC TROMETHAMINE 15 MG: 15 INJECTION, SOLUTION INTRAMUSCULAR; INTRAVENOUS at 11:52

## 2024-12-08 RX ADMIN — GADOBUTROL 10 ML: 604.72 INJECTION INTRAVENOUS at 18:20

## 2024-12-08 RX ADMIN — SODIUM CHLORIDE, POTASSIUM CHLORIDE, SODIUM LACTATE AND CALCIUM CHLORIDE: 600; 310; 30; 20 INJECTION, SOLUTION INTRAVENOUS at 01:54

## 2024-12-08 RX ADMIN — KETOROLAC TROMETHAMINE 15 MG: 15 INJECTION, SOLUTION INTRAMUSCULAR; INTRAVENOUS at 16:36

## 2024-12-08 RX ADMIN — KETOROLAC TROMETHAMINE 15 MG: 15 INJECTION, SOLUTION INTRAMUSCULAR; INTRAVENOUS at 01:53

## 2024-12-08 ASSESSMENT — ACTIVITIES OF DAILY LIVING (ADL)
ADLS_ACUITY_SCORE: 41

## 2024-12-08 NOTE — ED PROVIDER NOTES
ED APC SUPERVISION NOTE:   I evaluated this patient in conjunction with Satnam GLOVER  I have participated in the care of the patient and personally performed key elements of the history, exam, and medical decision making.      HPI:   Nurys Soria is a 44 year old female who presents with concern of a headache.  She was seen for this yesterday.  Patient has had headaches in the past.    Independent Historian:   None    Review of External Notes:   ED note from Dr. Collazo department from twelve 5.4     EXAM:   General:  Sitting on bed, comfortable appearing.   HENT:  No obvious trauma to head  Right Ear:  External ear normal.   Left Ear:  External ear normal.   Nose:  Nose normal.   Eyes:  Conjunctivae and EOM are normal.  Neck: Normal range of motion. Neck supple. No tracheal deviation present.   Pulm/Chest: No respiratory distress  M/S: Normal range of motion.   Neuro: Alert. GCS 15.  No facial droop.  Moving all extremities equally.  No meningeal signs.  Skin: Skin is warm and dry. No rash noted. Not diaphoretic.   Psych: Normal mood and affect. Behavior is normal.     Independent Interpretation (X-rays, CTs, rhythm strip):  None    Consultations/Discussion of Management or Tests:  None     MEDICAL DECISION MAKING/ASSESSMENT AND PLAN:   Nurys Soria is a very pleasant 44 year old year old patient who presents to the emergency department with concern of a headache.  CT and CT angiogram head and neck unremarkable other than a few nonspecific findings.  Patient received interventions for her headache, but headache persisted.  Magnesium now ordered.  Out of concern that patient may be lost to follow-up and for continued observation given persistent headache, she will be admitted to the hospital for the persistent headache and to see neurology in the morning.  No fever or leukocytosis to suggest meningitis or did she have any meningeal signs.  No aneurysm on CTA so doubt subarachnoid hemorrhage.  No  evidence of mastoiditis.     DIAGNOSIS:     ICD-10-CM    1. Intractable headache, unspecified chronicity pattern, unspecified headache type  R51.9             Markie Greco,   12/7/2024  [facility] EMERGENCY DEPT     Markie Greco,   12/07/24 2020

## 2024-12-08 NOTE — PROGRESS NOTES
[facility]    Hospitalist Progress Note    Date of Service (when I saw the patient): 12/08/2024  Admit date: 12/7/2024    Interval History   History as outlined below.    Assessment & Plan   Nurys Soria is a 44 year old female with history of iron deficiency anemia, chronic migraine who was admitted on 12/7/2024 with intractable headache.    Obtain history from patient directly in [place]an.  She states that she has a history of migraines but not severe like what she is experiencing now.  Also her migraines are usually bilateral frontal headaches.  This pain was more on the right and over her right ear.  She states this started last Saturday evening and progressively became worse.  On Sunday night she had an episode of subjective fever and vomiting.  No further episodes of fever or vomiting but continued to have progressive pain over her right forehead and side of her right head including inside her right ear.  She has noted achiness around her neck and back.  And noticed pain throughout her back with walking and states with each step, her head with throb.  She denies any visual disturbance or other neurological symptoms. No weakness, numbness, speech difficulty.  No aura or other neurologic deficits on exam. No trauma or recent medication changes.      Initial laboratory workup unremarkable and CT imaging showed an empty sella, arachnoid granulation in left transverse sinus, mild-moderate stenosis of right transverse sinus; unclear how or if these findings are playing a role in her current symptoms. MRI showed empty sella as well, which is nonspecific but can be seen in idiopathic intracranial HTN     Severe intractable headache  R ear pain w/out drainage  Arachnoid granulation of L transverse sinus and stenosis of R transverse sinus ? Significance?   Empty sella on imaging ? Idiopathic intracranial hypertension  Nausea w/out vomiting  Hx of migraines  * CT and MRI imaging as above.  "    Zofran, compazine prn   Scheduled toradol q6h x4 ordered   Pain finally started to resolve around noon after 3rd dose so will complete 4 doses.   Cr stable, repeat ordered in AM.   Using no other pain medications.   Ativan 0.5mg q6h prn (not using)   Start mIVF LR 100ml/hr overnight > STOP WHEN CURRENT BAG COMPLETE  Trial dose of sumatriptan now, can repeat x1 if no response in 2 hours  Neurology consult re imaging finding and medical mgt of recalcitrant headache.  Discussed with neurologist, he will likely recommend MRV and LP to evaluate for cerebral venous sinus thrombosis and elevated intracranial pressure.  Discussed above with patient.     E coli UTI - Diagnosed 12/4/24 with UTI and sent home on antibiotics.  She states at that time she had no urinary symptoms just back pain, as described above.  Reviewed UA from 12/4 showed positive nitrates trace leukocyte Estrace, urine culture grew greater than 100,000 E. coli sensitive to cephalosporins.   Completes course of cefdinir on 12/10    Hx of iron deficiency anemia - Hgb stable, not taking iron supplement  Recent Labs   Lab 12/08/24  0833 12/07/24  1728 12/05/24  2053   HGB 13.1 13.8 14.4         Clinically Significant Risk Factors Present on Admission                             # Obesity: Estimated body mass index is 31.7 kg/m  as calculated from the following:    Height as of this encounter: 1.575 m (5' 2\").    Weight as of this encounter: 78.6 kg (173 lb 4.8 oz).                Diet: Orders Placed This Encounter      Regular Diet Adult     IVF: None  DVT Prophylaxis: ambulating  Kidd Catheter: Not present    Full Code  Disposition:  Anticipate discharge possibly tomorrow pending neurological workup  PT/OT deferred   Communication: Discussed with patient and  at bedside on 12/08/24.  Obtained history and discussed plan in [place] answered all questions.    Elida Pizano MD    Hospitalist Service  [facility]  Securely " message with the Vocera Web Console (learn more here)  Text page via [employer] Paging/Directory      -Data reviewed today: I reviewed all new labs and imaging results over the last 24 hours. I personally reviewed no images or EKG's today.    Physical Exam   Temp: 98.3  F (36.8  C) Temp src: Oral BP: 93/56 Pulse: 68   Resp: 16 SpO2: 98 % O2 Device: None (Room air)    Vitals:    12/08/24 0121   Weight: 78.6 kg (173 lb 4.8 oz)     Vital Signs with Ranges  Temp:  [98.1  F (36.7  C)-98.4  F (36.9  C)] 98.3  F (36.8  C)  Pulse:  [61-87] 68  Resp:  [14-17] 16  BP: ()/(56-90) 93/56  SpO2:  [93 %-100 %] 98 %  I/O last 3 completed shifts:  In: 457 [I.V.:457]  Out: 400 [Urine:400]    Today's Exam  Constitutional:  NAD,   Neuropsyche:  alert and oriented, answers questions appropriately.     Respiratory:  Breathing comfortably, good air exchange, no wheezes, no crackles.   Cardiovascular:  Regular rate and rhythm, no edema.  GI:  soft, NT/ND, BS normal  Skin/Integumen:  No acute rash or sign of bleeding.     Medications   All medications reviewed on 12/08/24    Current Facility-Administered Medications   Medication Dose Route Frequency Provider Last Rate Last Admin    lactated ringers infusion   Intravenous Continuous Parvez Wilson  mL/hr at 12/08/24 0154 New Bag at 12/08/24 0154     Current Facility-Administered Medications   Medication Dose Route Frequency Provider Last Rate Last Admin    cefdinir (OMNICEF) capsule 300 mg  300 mg Oral BID Parvez Wilson MD        ketorolac (TORADOL) injection 15 mg  15 mg Intravenous Q6H Parvez Wilson MD   15 mg at 12/08/24 0153     PRN Meds:   Current Facility-Administered Medications   Medication Dose Route Frequency Provider Last Rate Last Admin    LORazepam (ATIVAN) tablet 0.5 mg  0.5 mg Oral Q4H PRN Parvez Wilson MD        ondansetron (ZOFRAN ODT) ODT tab 4 mg  4 mg Oral Q6H PRN Parvez Wilson MD        Or    ondansetron (ZOFRAN) injection 4 mg  4 mg Intravenous  Q6H PRN Parvez Wilson MD        prochlorperazine (COMPAZINE) injection 10 mg  10 mg Intravenous Q6H PRN Parvez Wilson MD        Or    prochlorperazine (COMPAZINE) tablet 10 mg  10 mg Oral Q6H PRParvez Collins MD        senna-docusate (SENOKOT-S/PERICOLACE) 8.6-50 MG per tablet 1 tablet  1 tablet Oral BID PRParvez Collins MD        Or    senna-docusate (SENOKOT-S/PERICOLACE) 8.6-50 MG per tablet 2 tablet  2 tablet Oral BID Parvez Sanchez MD           Data   Recent Labs   Lab 12/07/24 1728 12/05/24 2053   WBC 10.3 8.3   HGB 13.8 14.4   MCV 83 83    300    139   POTASSIUM 3.9 3.8   CHLORIDE 103 103   CO2 24 26   BUN 13.6 9.0   CR 0.86 0.88   ANIONGAP 13 10   CASSIE 9.1 9.3   * 122*   ALBUMIN  --  4.2   PROTTOTAL  --  7.2   BILITOTAL  --  0.7   ALKPHOS  --  101   ALT  --  16   AST  --  16       Recent Results (from the past 24 hours)   CTA Head Neck with Contrast    Narrative    EXAM: CTA HEAD NECK W CONTRAST  LOCATION: [facility]  DATE: 12/7/2024    INDICATION: Right sided headache neck pain  COMPARISON: None.  CONTRAST: 67mL Isovue 370  TECHNIQUE: Head and neck CT angiogram with IV contrast. Noncontrast head CT followed by axial helical CT images of the head and neck vessels obtained during the arterial phase of intravenous contrast administration. Axial 2D reconstructed images and   multiplanar 3D MIP reconstructed images of the head and neck vessels were performed by the technologist. Dose reduction techniques were used. All stenosis measurements made according to NASCET criteria unless otherwise specified.    FINDINGS:   NONCONTRAST HEAD CT:   INTRACRANIAL CONTENTS: No intracranial hemorrhage, extraaxial collection, or mass effect.  No CT evidence of acute infarct. Normal parenchymal attenuation. Normal ventricles and sulci. Empty sella.    VISUALIZED ORBITS/SINUSES/MASTOIDS: No intraorbital abnormality. No paranasal sinus mucosal disease. No  middle ear or mastoid effusion.    BONES/SOFT TISSUES: No acute abnormality.    HEAD CTA:  ANTERIOR CIRCULATION: No stenosis/occlusion, aneurysm, or high flow vascular malformation. Standard [place] anatomy.    POSTERIOR CIRCULATION: No stenosis/occlusion, aneurysm, or high flow vascular malformation. Dominant left and smaller right vertebral artery contribute to a normal basilar artery.     DURAL VENOUS SINUSES: Expected enhancement of the major dural venous sinuses.    NECK CTA:  RIGHT CAROTID: No measurable stenosis or dissection.    LEFT CAROTID: No measurable stenosis or dissection.    VERTEBRAL ARTERIES: No focal stenosis or dissection. Dominant left and smaller right vertebral arteries.    AORTIC ARCH: Classic aortic arch anatomy with no significant stenosis at the origin of the great vessels.    NONVASCULAR STRUCTURES: Unremarkable.      Impression    IMPRESSION:   HEAD CT:  1.  No acute process.    HEAD CTA:   1.  Normal CTA [place].    NECK CTA:  1.  Normal neck CTA.   CTV Head with Contrast    Narrative    EXAM: CTV HEAD WITH CONTRAST  LOCATION: [facility]  DATE: 12/7/2024    INDICATION: Headache dizziness, nausea  COMPARISON: None.  CONTRAST: 67mL Isovue 370  TECHNIQUE: Head CT venogram with IV contrast. Noncontrast head CT followed by axial helical CT images of the intracranial vessels obtained during the venous phase of intravenous contrast administration. Axial helical 2D reconstructed images and   multiplanar 3D MIP reconstructed images of the intracranial vessels were performed by the technologist. Dose reduction techniques were used.     FINDINGS:   NONCONTRAST HEAD CT:   INTRACRANIAL CONTENTS: No intracranial hemorrhage, extraaxial collection, or mass effect.  No CT evidence of acute infarct. Normal parenchymal attenuation. Normal ventricles and sulci. Empty sella.    VISUALIZED ORBITS/SINUSES/MASTOIDS: No intraorbital abnormality. No paranasal sinus  mucosal disease. No middle ear or mastoid effusion.    BONES/SOFT TISSUES: No acute abnormality.    HEAD CTV:  DURAL SINUSES: No occlusion. Dominant left and smaller right transverse and sigmoid dural sinuses. Mid left transverse sinus arachnoid granulation noted. Mild-to-moderate stenosis of the distal right transverse sinus.    INTERNAL CEREBRAL VEINS: No significant stenosis or occlusion.      MAJOR CORTICAL VENOUS BRANCHES: No significant stenosis or occlusion.      Impression    IMPRESSION:   HEAD CT:  1.  No acute process.    2.  Empty sella.    HEAD CTV:   1.  No evidence for dural sinus thrombosis.    2.  Arachnoid granulation in the mid left transverse sinus.    3.  Mild to moderate stenosis of the distal right transverse sinus.   Head CT w/o contrast    Narrative    EXAM: CT HEAD W/O CONTRAST  LOCATION: [facility]  DATE: 12/7/2024    INDICATION: headache, right sided  COMPARISON: None.  TECHNIQUE: Routine CT Head without IV contrast. Multiplanar reformats. Dose reduction techniques were used.    FINDINGS:  INTRACRANIAL CONTENTS: No intracranial hemorrhage, extraaxial collection, or mass effect.  No CT evidence of acute infarct. Normal parenchymal attenuation. Normal ventricles and sulci. Empty sella.    VISUALIZED ORBITS/SINUSES/MASTOIDS: No intraorbital abnormality. No paranasal sinus mucosal disease. No middle ear or mastoid effusion.    BONES/SOFT TISSUES: No acute abnormality.      Impression    IMPRESSION:  1.  No acute process.   MR Brain w/o & w Contrast    Narrative    EXAM: MR BRAIN W/O and W CONTRAST  LOCATION: [facility]  DATE: 12/8/2024    INDICATION: Intractable headache, right ear pain  COMPARISON: 12/7/2024  CONTRAST: mL Gadavist   TECHNIQUE: Routine multiplanar multisequence head MRI without and with intravenous contrast.    FINDINGS:  INTRACRANIAL CONTENTS: No acute or subacute infarct. No mass, acute hemorrhage, or extra-axial fluid  collections. Normal brain parenchymal signal. Normal ventricles and sulci. Normal position of the cerebellar tonsils. No pathologic contrast enhancement.    SELLA: Empty sella morphology with flattening of the pituitary gland along the sellar floor.    OSSEOUS STRUCTURES/SOFT TISSUES: Normal marrow signal. The major intracranial vascular flow voids are maintained.     ORBITS: No abnormality accounting for technique.     SINUSES/MASTOIDS: No paranasal sinus mucosal disease. No middle ear or mastoid effusion.       Impression    IMPRESSION:  1.  No acute findings.  2.  Empty sella morphology with flattening of the pituitary gland along the sellar floor. This is a nonspecific finding but can be seen in the setting of idiopathic intracranial hypertension.  3.  Otherwise unremarkable.

## 2024-12-08 NOTE — ED NOTES
[facility]  ED Nurse Handoff Report    ED Chief complaint: Otalgia      ED Diagnosis:   Final diagnoses:   Intractable headache, unspecified chronicity pattern, unspecified headache type   Abnormal CT of the head - non-specific, empty Sella, venous narrowing       Code Status: to be addressed    Allergies:   Allergies   Allergen Reactions    Nka [No Known Allergies]        Patient Story:bobby Soria is a 44 year old female with a history of migraines who presents to the ED for otalgia. The patient reports that her right sided headache has not improved since her last ED visit 3 days ago, and she has now developed pain in her right ear. She states that she has pain all over her head and face now. She endorses nausea and neck pain. Denies any vomiting, fever, numbness/weakness in her upper/lower extremities, vision changes, ringing in her ears, or nasal congestion. Further denies anyone in her household with similar symptoms. Patient states that she is on Excedrin for her existing history of migraines, but has not taken this, or any other medication today. She has no history of blood clots.      Focused Assessment:  right ear pain and headache     Treatments and/or interventions provided: labs,medication and CT  Patient's response to treatments and/or interventions: ongoing    To be done/followed up on inpatient unit:  .    Does this patient have any cognitive concerns?:  no    Activity level - Baseline/Home:  Independent  Activity Level - Current:   Independent    Patient's Preferred language: [place]ian   Needed?: No    Isolation: None  Infection: Not Applicable  Patient tested for COVID 19 prior to admission: YES  Bariatric?: No    Vital Signs:   Vitals:    12/07/24 1522 12/07/24 1800 12/07/24 1830 12/07/24 1900   BP: 133/83 106/71 110/75 104/70   Pulse: 87 79 77 74   Resp: 14   16   Temp: 98.4  F (36.9  C)      TempSrc: Oral      SpO2: 96% 98% 100% 100%       Cardiac Rhythm:     Was  the PSS-3 completed:   Yes  What interventions are required if any?  no             Family Comments: family at bedside  OBS brochure/video discussed/provided to patient/family: No              Name of person given brochure if not patient: .              Relationship to patient: .    For the majority of the shift this patient's behavior was Green.   Behavioral interventions performed were none.    ED NURSE PHONE NUMBER: [phone]

## 2024-12-08 NOTE — PLAN OF CARE
Goal Outcome Evaluation:      Plan of Care Reviewed With: patient    Overall Patient Progress: improvingOverall Patient Progress: improving    -vital signs normal or at patient baseline  yes  -tolerating oral intake to maintain hydration yes  -adequate pain control on oral analgesics  yes  -returns to baseline functional status  yes  -safe disposition plan has been identified no  .   Ed admission for intractable headache and R ear pain. Room with dim light and calm atmosphere. Encouraging fluids. LR infusing at 100. Pain controlled with toradol. PT supplied abx in drawer for UTI. MRI complete, see results.

## 2024-12-08 NOTE — PLAN OF CARE
Goal Outcome Evaluation:      Plan of Care Reviewed With: patient    Overall Patient Progress: improvingOverall Patient Progress: improving       ,Shift: 12/8/24, [id number]  POD#/Summary: Admitted 12/7 with intractable headache and R ear pain    Orientation: A&Ox4  Vital Signs: VSS on RA  Pain Management: Scheduled toradol x1  Bowel/Bladder: Continent and independent  IV: R PIV - SL  Diet: Regular  Activity Level: Independent  Anticipated Discharge Date/Plan: Pending

## 2024-12-08 NOTE — CONSULTS
Neurology Note  [facility]          Patient Name: Nurys Soria  MRN: [id number]  : 1980      Reason for Consult: Severe headache and concerns of pseudotumor cerebri.    History  Ms. Soria is a 44 year old who was admitted on  for a bad headache.  She was seen in the emergency room in  for a bad migraine.  At that time she got a migraine cocktail including Reglan, Toradol and Decadron.  She also received an occipital nerve block which helped so was later discharged home.  She subsequently developed right-sided ear pain.  She was also having nausea and neck pain so came back to emergency room.  Of note she was also diagnosed recently with urinary tract infection and was started on antibiotics.  Upon my evaluation she says that she is doing better.  Her pain scale is a 2 out of a 10.  She denies any tinnitus or blurry vision.  She denies any nausea, vomiting, light or sound sensitivity.  Her pain is located on the right side.  She says this is a little different than her typical migraine.  Problem List:   Patient Active Problem List   Diagnosis    Intractable headache, unspecified chronicity pattern, unspecified headache type       Past Surgical History:  Past Surgical History:   Procedure Laterality Date    GYN SURGERY      tubaligation\     Medications:  No current outpatient medications on file.     Allergies:  Allergies   Allergen Reactions    Nka [No Known Allergies]      No family history on file.  Social History:  Social History     Socioeconomic History    Marital status:      Spouse name: Not on file    Number of children: Not on file    Years of education: Not on file    Highest education level: Not on file   Occupational History    Not on file   Tobacco Use    Smoking status: Never    Smokeless tobacco: Never   Substance and Sexual Activity    Alcohol use: No    Drug use: Not on file    Sexual activity: Not on file   Other Topics  "Concern    Not on file   Social History Narrative    Not on file     Social Drivers of Health     Financial Resource Strain: Not on file   Food Insecurity: Not on file   Transportation Needs: Not on file   Physical Activity: Not on file   Stress: Not on file   Social Connections: Not on file   Interpersonal Safety: Not on file   Housing Stability: Not on file         Vital Signs:  /65   Pulse 75   Temp 98.4  F (36.9  C) (Oral)   Resp 16   Ht 1.575 m (5' 2\")   Wt 78.6 kg (173 lb 4.8 oz)   SpO2 100%   BMI 31.70 kg/m        Neurological examination  Mental Status: The patient is alert and oriented. Recent memory is normal. The person is  attentive with normal concentration. Language is fluent. Speech is of normal patrice and  character. The speech is nondysarthric. Fund of knowledge appears normal  Cranial Nerve I: Not tested.  Cranial Nerve II: Equally round and reactive to light.  Cranial Nerves III, IV, VI: The extraocular movements are full in all directions of gaze without  ophthalmoplegia or nystagmus.  Cranial Nerve V: Light touch is intact and symmetric in the V1, V2, V3 divisions of both  trigeminal nerves.  Cranial Nerve VII: Facial movements are symmetric.  Cranial Nerve XI: Normal shoulder shrug.  Motor: Muscle Strength: The strength was 5/5 in upper and lower extremities bilaterally.  Reflexes: The reflexes are 2/4 for biceps, patellar tendon reflexes bilaterally and  symmetrically.  Sensory: The sensory examination is normal for light touch bilaterally and symmetrically.  Cerebellar: The cerebellar examination is normal to finger to nose test.    Review of Diagnostics:    MR Brain w/o & w Contrast    Result Date: 12/8/2024  EXAM: MR BRAIN W/O and W CONTRAST LOCATION: [facility] DATE: 12/8/2024 INDICATION: Intractable headache, right ear pain COMPARISON: 12/7/2024 CONTRAST: mL Gadavist TECHNIQUE: Routine multiplanar multisequence head MRI without and with intravenous " contrast. FINDINGS: INTRACRANIAL CONTENTS: No acute or subacute infarct. No mass, acute hemorrhage, or extra-axial fluid collections. Normal brain parenchymal signal. Normal ventricles and sulci. Normal position of the cerebellar tonsils. No pathologic contrast enhancement. SELLA: Empty sella morphology with flattening of the pituitary gland along the sellar floor. OSSEOUS STRUCTURES/SOFT TISSUES: Normal marrow signal. The major intracranial vascular flow voids are maintained. ORBITS: No abnormality accounting for technique. SINUSES/MASTOIDS: No paranasal sinus mucosal disease. No middle ear or mastoid effusion.     IMPRESSION: 1.  No acute findings. 2.  Empty sella morphology with flattening of the pituitary gland along the sellar floor. This is a nonspecific finding but can be seen in the setting of idiopathic intracranial hypertension. 3.  Otherwise unremarkable.     Head CT w/o contrast    Result Date: 12/7/2024  EXAM: CT HEAD W/O CONTRAST LOCATION: [facility] DATE: 12/7/2024 INDICATION: headache, right sided COMPARISON: None. TECHNIQUE: Routine CT Head without IV contrast. Multiplanar reformats. Dose reduction techniques were used. FINDINGS: INTRACRANIAL CONTENTS: No intracranial hemorrhage, extraaxial collection, or mass effect.  No CT evidence of acute infarct. Normal parenchymal attenuation. Normal ventricles and sulci. Empty sella. VISUALIZED ORBITS/SINUSES/MASTOIDS: No intraorbital abnormality. No paranasal sinus mucosal disease. No middle ear or mastoid effusion. BONES/SOFT TISSUES: No acute abnormality.     IMPRESSION: 1.  No acute process.    CTV Head with Contrast    Result Date: 12/7/2024  EXAM: CTV HEAD WITH CONTRAST LOCATION: [facility] DATE: 12/7/2024 INDICATION: Headache dizziness, nausea COMPARISON: None. CONTRAST: 67mL Isovue 370 TECHNIQUE: Head CT venogram with IV contrast. Noncontrast head CT followed by axial helical CT images of the  intracranial vessels obtained during the venous phase of intravenous contrast administration. Axial helical 2D reconstructed images and multiplanar 3D MIP reconstructed images of the intracranial vessels were performed by the technologist. Dose reduction techniques were used. FINDINGS: NONCONTRAST HEAD CT: INTRACRANIAL CONTENTS: No intracranial hemorrhage, extraaxial collection, or mass effect.  No CT evidence of acute infarct. Normal parenchymal attenuation. Normal ventricles and sulci. Empty sella. VISUALIZED ORBITS/SINUSES/MASTOIDS: No intraorbital abnormality. No paranasal sinus mucosal disease. No middle ear or mastoid effusion. BONES/SOFT TISSUES: No acute abnormality. HEAD CTV: DURAL SINUSES: No occlusion. Dominant left and smaller right transverse and sigmoid dural sinuses. Mid left transverse sinus arachnoid granulation noted. Mild-to-moderate stenosis of the distal right transverse sinus. INTERNAL CEREBRAL VEINS: No significant stenosis or occlusion.  MAJOR CORTICAL VENOUS BRANCHES: No significant stenosis or occlusion.     IMPRESSION: HEAD CT: 1.  No acute process. 2.  Empty sella. HEAD CTV: 1.  No evidence for dural sinus thrombosis. 2.  Arachnoid granulation in the mid left transverse sinus. 3.  Mild to moderate stenosis of the distal right transverse sinus.    CTA Head Neck with Contrast    Result Date: 12/7/2024  EXAM: CTA HEAD NECK W CONTRAST LOCATION: [facility] DATE: 12/7/2024 INDICATION: Right sided headache neck pain COMPARISON: None. CONTRAST: 67mL Isovue 370 TECHNIQUE: Head and neck CT angiogram with IV contrast. Noncontrast head CT followed by axial helical CT images of the head and neck vessels obtained during the arterial phase of intravenous contrast administration. Axial 2D reconstructed images and multiplanar 3D MIP reconstructed images of the head and neck vessels were performed by the technologist. Dose reduction techniques were used. All stenosis measurements  made according to NASCET criteria unless otherwise specified. FINDINGS: NONCONTRAST HEAD CT: INTRACRANIAL CONTENTS: No intracranial hemorrhage, extraaxial collection, or mass effect.  No CT evidence of acute infarct. Normal parenchymal attenuation. Normal ventricles and sulci. Empty sella. VISUALIZED ORBITS/SINUSES/MASTOIDS: No intraorbital abnormality. No paranasal sinus mucosal disease. No middle ear or mastoid effusion. BONES/SOFT TISSUES: No acute abnormality. HEAD CTA: ANTERIOR CIRCULATION: No stenosis/occlusion, aneurysm, or high flow vascular malformation. Standard [place] anatomy. POSTERIOR CIRCULATION: No stenosis/occlusion, aneurysm, or high flow vascular malformation. Dominant left and smaller right vertebral artery contribute to a normal basilar artery. DURAL VENOUS SINUSES: Expected enhancement of the major dural venous sinuses. NECK CTA: RIGHT CAROTID: No measurable stenosis or dissection. LEFT CAROTID: No measurable stenosis or dissection. VERTEBRAL ARTERIES: No focal stenosis or dissection. Dominant left and smaller right vertebral arteries. AORTIC ARCH: Classic aortic arch anatomy with no significant stenosis at the origin of the great vessels. NONVASCULAR STRUCTURES: Unremarkable.     IMPRESSION: HEAD CT: 1.  No acute process. HEAD CTA: 1.  Normal CTA [place]. NECK CTA: 1.  Normal neck CTA.    CT Head w/o Contrast    Result Date: 12/6/2024  EXAM: CT HEAD W/O CONTRAST LOCATION: [facility] DATE: 12/6/2024 INDICATION: Headache. COMPARISON: None. TECHNIQUE: Routine CT Head without IV contrast. Multiplanar reformats. Dose reduction techniques were used. FINDINGS: INTRACRANIAL CONTENTS: No intracranial hemorrhage, extraaxial collection, or mass effect.  No CT evidence of acute infarct. Normal parenchymal attenuation. Normal ventricles and sulci. Empty sella. VISUALIZED ORBITS/SINUSES/MASTOIDS: No intraorbital abnormality. No paranasal sinus mucosal disease. No  middle ear or mastoid effusion. BONES/SOFT TISSUES: No acute abnormality.     IMPRESSION: 1.  No acute intracranial process. 2.  Empty sella. This is a common incidental finding, but can also be seen in setting of idiopathic intracranial hypertension, correlation needed.      Complete Blood Count:    Recent Labs   Lab Test 12/08/24  0833 12/07/24 1728 12/05/24 2053 11/18/17  1550   WBC 10.8 10.3 8.3 7.6   RBC 4.72 5.14 5.19 4.91   HGB 13.1 13.8 14.4 13.3   HCT 38.6 42.7 42.9 40.0    291 300 268   LYMPH  --  16 23 12.4            Recent Labs   Lab Test 12/08/24  0833 12/07/24  1728   WBC 10.8 10.3   HGB 13.1 13.8   MCV 82 83    291      Recent Labs   Lab Test 12/08/24 0833 12/07/24 1728    140   POTASSIUM 4.1 3.9   CHLORIDE 108* 103   CO2 22 24   BUN 8.0 13.6   CR 0.63 0.86   ANIONGAP 8 13   CASSIE 8.5* 9.1   * 109*     CMP:  Recent Labs   Lab 12/08/24 0833 12/05/24 2053   PROTTOTAL 6.0* 7.2   ALBUMIN 3.6 4.2   ALKPHOS 81 101   AST 16 16   ALT 12 16   BILITOTAL 0.4 0.7       Impression and Plan:  Ms. Soria is a 44 year old With a past medical history of chronic migraine iron deficiency who was admitted on December 7 after she was having a bad leg along with nausea, neck pain and ear pain.  She recently was also seen in the emergency room and received a migraine cocktail along with nerve block which helped.    Plan  -MRI brain completed showing signs of empty sella syndrome.  This could be an incidental finding or could be possibly related to intracranial hypertension.  Would need to do lumbar puncture to check opening pressure.  She would like to think about it as her headaches have improved.  -CTV showed mild to moderate stenosis of the distal right transverse sinus.  Will check MRV as this is more accurate.  This could also be an incidental finding or related to intracranial hypertension.        Mark Mata MD  Neurology    Thank you very much for allowing me to participate  in the care of this patient.

## 2024-12-08 NOTE — CONSULTS
Neurology Note  [facility]          Patient Name: Nurys Soria  MRN: [id number]  : 1980      Reason for Consult: Severe headache and concerns of pseudotumor cerebri.    History  Ms. Soria is a 44 year old who was admitted on  for a bad headache.  She was seen in the emergency room in  for a bad migraine.  At that time she got a migraine cocktail including Reglan, Toradol and Decadron.  She also received an occipital nerve block which helped so was later discharged home.  She subsequently developed right-sided ear pain.  She was also having nausea and neck pain so came back to emergency room.  Of note she was also diagnosed recently with urinary tract infection and was started on antibiotics.  Problem List:   Patient Active Problem List   Diagnosis    Intractable headache, unspecified chronicity pattern, unspecified headache type       Past Surgical History:  Past Surgical History:   Procedure Laterality Date    GYN SURGERY      tubaligation\     Medications:  No current outpatient medications on file.     Allergies:  Allergies   Allergen Reactions    Nka [No Known Allergies]      No family history on file.  Social History:  Social History     Socioeconomic History    Marital status:      Spouse name: Not on file    Number of children: Not on file    Years of education: Not on file    Highest education level: Not on file   Occupational History    Not on file   Tobacco Use    Smoking status: Never    Smokeless tobacco: Never   Substance and Sexual Activity    Alcohol use: No    Drug use: Not on file    Sexual activity: Not on file   Other Topics Concern    Not on file   Social History Narrative    Not on file     Social Drivers of Health     Financial Resource Strain: Not on file   Food Insecurity: Not on file   Transportation Needs: Not on file   Physical Activity: Not on file   Stress: Not on file   Social Connections: Not on file  "  Interpersonal Safety: Not on file   Housing Stability: Not on file         Vital Signs:  /65   Pulse 75   Temp 98.4  F (36.9  C) (Oral)   Resp 16   Ht 1.575 m (5' 2\")   Wt 78.6 kg (173 lb 4.8 oz)   SpO2 100%   BMI 31.70 kg/m        Neurological examination  Mental Status: The patient is alert and oriented. Recent memory is normal. The person is  attentive with normal concentration. Language is fluent. Speech is of normal patrice and  character. The speech is nondysarthric. Fund of knowledge appears normal  Cranial Nerve I: Not tested.  Cranial Nerve II: Equally round and reactive to light.  Cranial Nerves III, IV, VI: The extraocular movements are full in all directions of gaze without  ophthalmoplegia or nystagmus.  Cranial Nerve V: Light touch is intact and symmetric in the V1, V2, V3 divisions of both  trigeminal nerves.  Cranial Nerve VII: Facial movements are symmetric.  Cranial Nerve XI: Normal shoulder shrug.  Motor: Muscle Strength: The strength was 5/5 in upper and lower extremities bilaterally.  Reflexes: The reflexes are 2/4 for biceps, patellar tendon reflexes bilaterally and  symmetrically.  Sensory: The sensory examination is normal for light touch bilaterally and symmetrically.  Cerebellar: The cerebellar examination is normal to finger to nose test.    Review of Diagnostics:    MR Brain w/o & w Contrast    Result Date: 12/8/2024  EXAM: MR BRAIN W/O and W CONTRAST LOCATION: [facility] DATE: 12/8/2024 INDICATION: Intractable headache, right ear pain COMPARISON: 12/7/2024 CONTRAST: mL Gadavist TECHNIQUE: Routine multiplanar multisequence head MRI without and with intravenous contrast. FINDINGS: INTRACRANIAL CONTENTS: No acute or subacute infarct. No mass, acute hemorrhage, or extra-axial fluid collections. Normal brain parenchymal signal. Normal ventricles and sulci. Normal position of the cerebellar tonsils. No pathologic contrast enhancement. SELLA: Empty sella " morphology with flattening of the pituitary gland along the sellar floor. OSSEOUS STRUCTURES/SOFT TISSUES: Normal marrow signal. The major intracranial vascular flow voids are maintained. ORBITS: No abnormality accounting for technique. SINUSES/MASTOIDS: No paranasal sinus mucosal disease. No middle ear or mastoid effusion.     IMPRESSION: 1.  No acute findings. 2.  Empty sella morphology with flattening of the pituitary gland along the sellar floor. This is a nonspecific finding but can be seen in the setting of idiopathic intracranial hypertension. 3.  Otherwise unremarkable.     Head CT w/o contrast    Result Date: 12/7/2024  EXAM: CT HEAD W/O CONTRAST LOCATION: [facility] DATE: 12/7/2024 INDICATION: headache, right sided COMPARISON: None. TECHNIQUE: Routine CT Head without IV contrast. Multiplanar reformats. Dose reduction techniques were used. FINDINGS: INTRACRANIAL CONTENTS: No intracranial hemorrhage, extraaxial collection, or mass effect.  No CT evidence of acute infarct. Normal parenchymal attenuation. Normal ventricles and sulci. Empty sella. VISUALIZED ORBITS/SINUSES/MASTOIDS: No intraorbital abnormality. No paranasal sinus mucosal disease. No middle ear or mastoid effusion. BONES/SOFT TISSUES: No acute abnormality.     IMPRESSION: 1.  No acute process.    CTV Head with Contrast    Result Date: 12/7/2024  EXAM: CTV HEAD WITH CONTRAST LOCATION: [facility] DATE: 12/7/2024 INDICATION: Headache dizziness, nausea COMPARISON: None. CONTRAST: 67mL Isovue 370 TECHNIQUE: Head CT venogram with IV contrast. Noncontrast head CT followed by axial helical CT images of the intracranial vessels obtained during the venous phase of intravenous contrast administration. Axial helical 2D reconstructed images and multiplanar 3D MIP reconstructed images of the intracranial vessels were performed by the technologist. Dose reduction techniques were used. FINDINGS: NONCONTRAST  HEAD CT: INTRACRANIAL CONTENTS: No intracranial hemorrhage, extraaxial collection, or mass effect.  No CT evidence of acute infarct. Normal parenchymal attenuation. Normal ventricles and sulci. Empty sella. VISUALIZED ORBITS/SINUSES/MASTOIDS: No intraorbital abnormality. No paranasal sinus mucosal disease. No middle ear or mastoid effusion. BONES/SOFT TISSUES: No acute abnormality. HEAD CTV: DURAL SINUSES: No occlusion. Dominant left and smaller right transverse and sigmoid dural sinuses. Mid left transverse sinus arachnoid granulation noted. Mild-to-moderate stenosis of the distal right transverse sinus. INTERNAL CEREBRAL VEINS: No significant stenosis or occlusion.  MAJOR CORTICAL VENOUS BRANCHES: No significant stenosis or occlusion.     IMPRESSION: HEAD CT: 1.  No acute process. 2.  Empty sella. HEAD CTV: 1.  No evidence for dural sinus thrombosis. 2.  Arachnoid granulation in the mid left transverse sinus. 3.  Mild to moderate stenosis of the distal right transverse sinus.    CTA Head Neck with Contrast    Result Date: 12/7/2024  EXAM: CTA HEAD NECK W CONTRAST LOCATION: [facility] DATE: 12/7/2024 INDICATION: Right sided headache neck pain COMPARISON: None. CONTRAST: 67mL Isovue 370 TECHNIQUE: Head and neck CT angiogram with IV contrast. Noncontrast head CT followed by axial helical CT images of the head and neck vessels obtained during the arterial phase of intravenous contrast administration. Axial 2D reconstructed images and multiplanar 3D MIP reconstructed images of the head and neck vessels were performed by the technologist. Dose reduction techniques were used. All stenosis measurements made according to NASCET criteria unless otherwise specified. FINDINGS: NONCONTRAST HEAD CT: INTRACRANIAL CONTENTS: No intracranial hemorrhage, extraaxial collection, or mass effect.  No CT evidence of acute infarct. Normal parenchymal attenuation. Normal ventricles and sulci. Empty sella.  VISUALIZED ORBITS/SINUSES/MASTOIDS: No intraorbital abnormality. No paranasal sinus mucosal disease. No middle ear or mastoid effusion. BONES/SOFT TISSUES: No acute abnormality. HEAD CTA: ANTERIOR CIRCULATION: No stenosis/occlusion, aneurysm, or high flow vascular malformation. Standard [place] anatomy. POSTERIOR CIRCULATION: No stenosis/occlusion, aneurysm, or high flow vascular malformation. Dominant left and smaller right vertebral artery contribute to a normal basilar artery. DURAL VENOUS SINUSES: Expected enhancement of the major dural venous sinuses. NECK CTA: RIGHT CAROTID: No measurable stenosis or dissection. LEFT CAROTID: No measurable stenosis or dissection. VERTEBRAL ARTERIES: No focal stenosis or dissection. Dominant left and smaller right vertebral arteries. AORTIC ARCH: Classic aortic arch anatomy with no significant stenosis at the origin of the great vessels. NONVASCULAR STRUCTURES: Unremarkable.     IMPRESSION: HEAD CT: 1.  No acute process. HEAD CTA: 1.  Normal CTA [place]. NECK CTA: 1.  Normal neck CTA.    CT Head w/o Contrast    Result Date: 12/6/2024  EXAM: CT HEAD W/O CONTRAST LOCATION: [facility] DATE: 12/6/2024 INDICATION: Headache. COMPARISON: None. TECHNIQUE: Routine CT Head without IV contrast. Multiplanar reformats. Dose reduction techniques were used. FINDINGS: INTRACRANIAL CONTENTS: No intracranial hemorrhage, extraaxial collection, or mass effect.  No CT evidence of acute infarct. Normal parenchymal attenuation. Normal ventricles and sulci. Empty sella. VISUALIZED ORBITS/SINUSES/MASTOIDS: No intraorbital abnormality. No paranasal sinus mucosal disease. No middle ear or mastoid effusion. BONES/SOFT TISSUES: No acute abnormality.     IMPRESSION: 1.  No acute intracranial process. 2.  Empty sella. This is a common incidental finding, but can also be seen in setting of idiopathic intracranial hypertension, correlation needed.      Complete Blood  Count:    Recent Labs   Lab Test 12/08/24  0833 12/07/24  1728 12/05/24 2053 11/18/17  1550   WBC 10.8 10.3 8.3 7.6   RBC 4.72 5.14 5.19 4.91   HGB 13.1 13.8 14.4 13.3   HCT 38.6 42.7 42.9 40.0    291 300 268   LYMPH  --  16 23 12.4            Recent Labs   Lab Test 12/08/24  0833 12/07/24  1728   WBC 10.8 10.3   HGB 13.1 13.8   MCV 82 83    291      Recent Labs   Lab Test 12/08/24  0833 12/07/24  1728    140   POTASSIUM 4.1 3.9   CHLORIDE 108* 103   CO2 22 24   BUN 8.0 13.6   CR 0.63 0.86   ANIONGAP 8 13   CASSIE 8.5* 9.1   * 109*     CMP:  Recent Labs   Lab 12/08/24 0833 12/05/24 2053   PROTTOTAL 6.0* 7.2   ALBUMIN 3.6 4.2   ALKPHOS 81 101   AST 16 16   ALT 12 16   BILITOTAL 0.4 0.7       Impression and Plan:  Ms. Soria is a 44 year old With a past medical history of chronic migraine iron deficiency who was admitted on December 7 after she was having a bad leg along with nausea, neck pain and ear pain.  She recently was also seen in the emergency room and received a migraine cocktail along with nerve block which helped.    Plan-  -MRI brain completed showing signs of empty sella syndrome.  This could be an incidental finding or could be possibly related to intracranial hypertension.  Would need to do lumbar puncture to check opening pressure.  -CTV showed mild to moderate stenosis of the distal right transverse sinus.  Will check MRV.  This could also be an incidental finding or related to intracranial hypertension.        Mark Mata MD  Neurology    Thank you very much for allowing me to participate in the care of this patient.

## 2024-12-08 NOTE — H&P
[facility]    History and Physical - Hospitalist Service       Date of Admission:  12/7/2024    Assessment & Plan      Nurys Soria is a 44 year old female with history of iron deficiency anemia, chronic migraine who was admitted on 12/7/2024 with intractable headache.    Severe intractable headache  R ear pain w/out drainage  Nausea w/out vomiting  Hx of migraines  Patient with history of intermittent headache/migraine who presented again to the ED with severe headache.  Patient was recently seen 12/5 for similar symptoms that improved after occipital nerve block.  She further endorses right ear pain without drainage but denies hearing or vision loss, neck pain, emesis.  She notes subjective fever on 12/2 without recurrence or known sick contacts.  No aura or other neurologic deficits on exam. No trauma or recent medication changes.  Initial laboratory workup unremarkable and CT imaging showed an empty sella, arachnoid granulation in left transverse sinus, mild-moderate stenosis of right transverse sinus; unclear how or if these findings are playing a role in her current symptoms.  No ongoing fever, nuchal rigidity, leukocytosis, other neuro changes to warrant urgent LP at this time.  Given prolonged, severe symptoms, I do recommend completing imaging workup with MRI brain.  -Zofran, compazine prn   +Repeat EKG ordered for 12/8  -Scheduled toradol q6h x4 ordered  -Ativan 0.5mg q6h prn  -Start mIVF LR 100ml/hr overnight  -MRI brain w/ and w/o  -Trial dose of sumatriptan now, can repeat x1 if no response in 2 hours  -Pending ongoing severe headache and MRI findings, consider Neurology consult in AM  -Continue basic headache/migraine cares: room lights off, quiet room, encourage PO intake    E coli UTI - Diagnosed 12/4/24, complete course of cefdinir (ends 12/10)  Hx of iron deficiency anemia - Hgb stable, not taking iron supplement        Diet:  Regular  DVT Prophylaxis: Pneumatic  Compression Devices and Ambulate every shift  Kidd Catheter: Not present  Lines: None     Cardiac Monitoring: None  Code Status:  FULL CODE    Clinically Significant Risk Factors Present on Admission                                      Disposition Plan     Medically Ready for Discharge: Anticipated Tomorrow     Parvez Wilson MD  Hospitalist Service  [facility]  Securely message with [employer] (more info)  Text page via [facility] Paging/Directory     ______________________________________________________________________    Chief Complaint   Headache    History is obtained from the patient    History of Present Illness   Nurys Soria is a 44 year old female with history of iron deficiency anemia, chronic migraine who presents to the ED w/ R ear pain and headache that began last Sunday. She was recently seen in the ED a couple of days ago with headache and discharged back to home. Since then, symptoms have persisted and evolved to include R ear pain. Endorses nausea and neck pain. Denies vomiting, fever, sensory changes. No sick contacts.     Initial ED workup notable for BMP WNL, hcg negative, CBC WNL. CT head w/ empty sella, arachnoid granulation in mid left transverse sinus, mild-moderate R transverse sinus stenosis. CTA head neck normal. Patient received dexamethasone, benadryl, toradol, reglan, 1L IVF, and magnesium. She remained HDS.    In my discussion with the patient, she describes ongoing, unrelenting headache since last Sunday.  Notes history of intermittent headaches, however, none compare to her current symptoms.  Endorses nausea without vomiting and denies neck pain/nuchal rigidity.  States that she felt warm earlier in the week but denies any additional fever, chills.  No sick contacts.  No vision changes or symptoms consistent with aura.  No changes in sensation.  Does note new onset right ear pain without drainage over the past 1 to 2 days.  Denies hearing loss or  pulsatile tinnitus.    Past Medical History    No past medical history on file.    Past Surgical History   Past Surgical History:   Procedure Laterality Date    GYN SURGERY      tubaligation\     Prior to Admission Medications   Prior to Admission Medications   Prescriptions Last Dose Informant Patient Reported? Taking?   Ferrous Sulfate (IRON SUPPLEMENT PO)   Yes No   Patient not taking: Reported on 12/4/2024   VITAMIN D, CHOLECALCIFEROL, PO   Yes No   Sig: Take by mouth daily   Patient not taking: Reported on 12/4/2024   cefdinir (OMNICEF) 300 MG capsule   No No   Sig: Take 1 capsule (300 mg) by mouth 2 times daily for 7 days.   cyclobenzaprine (FLEXERIL) 10 MG tablet   No No   Sig: Take 0.5-1 tablets (5-10 mg) by mouth 3 times daily as needed for muscle spasms (May cause drowsiness.)   Patient not taking: Reported on 12/4/2024   ondansetron (ZOFRAN ODT) 4 MG ODT tab   No No   Sig: Take 1 tablet (4 mg) by mouth every 4 hours as needed for nausea   Patient not taking: Reported on 12/4/2024   ondansetron (ZOFRAN ODT) 8 MG ODT tab   No No   Sig: Take 1 tablet (8 mg) by mouth every 8 hours as needed for nausea.      Facility-Administered Medications: None      Review of Systems    The 10 point Review of Systems is negative other than noted in the HPI or here.     Physical Exam   Vital Signs: Temp: 98.4  F (36.9  C) Temp src: Oral BP: 104/70 Pulse: 74   Resp: 16 SpO2: 100 % O2 Device: None (Room air)    Weight: 0 lbs 0 oz    General: Awake, alert, appears uncomfortable  HEENT: Atraumatic, normocephalic, EOMI, no scleral icterus, vision/hearing grossly intact, PERRL  CV: RRR, no murmurs, no GERALD, distal pulses intact  Pulm: CTAB, breathing comfortably on RA, no wheezes, no crackles  Abd: Soft, non-tender, non-distended, no overlying skin changes, no palpable hepatosplenomegaly  Skin: No rashes, lesions, or wounds visualized  Neuro: AOx4, CN II-XII grossly intact, no focal deficits, moving all extremities spontaneously,  speech normal    Medical Decision Making       90 MINUTES SPENT BY ME on the date of service doing chart review, history, exam, documentation & further activities per the note.      Data     I have personally reviewed the following data over the past 24 hrs:    10.3  \   13.8   / 291     140 103 13.6 /  109 (H)   3.9 24 0.86 \       Imaging results reviewed over the past 24 hrs:   Recent Results (from the past 24 hours)   CTA Head Neck with Contrast    Narrative    EXAM: CTA HEAD NECK W CONTRAST  LOCATION: [facility]  DATE: 12/7/2024    INDICATION: Right sided headache neck pain  COMPARISON: None.  CONTRAST: 67mL Isovue 370  TECHNIQUE: Head and neck CT angiogram with IV contrast. Noncontrast head CT followed by axial helical CT images of the head and neck vessels obtained during the arterial phase of intravenous contrast administration. Axial 2D reconstructed images and   multiplanar 3D MIP reconstructed images of the head and neck vessels were performed by the technologist. Dose reduction techniques were used. All stenosis measurements made according to NASCET criteria unless otherwise specified.    FINDINGS:   NONCONTRAST HEAD CT:   INTRACRANIAL CONTENTS: No intracranial hemorrhage, extraaxial collection, or mass effect.  No CT evidence of acute infarct. Normal parenchymal attenuation. Normal ventricles and sulci. Empty sella.    VISUALIZED ORBITS/SINUSES/MASTOIDS: No intraorbital abnormality. No paranasal sinus mucosal disease. No middle ear or mastoid effusion.    BONES/SOFT TISSUES: No acute abnormality.    HEAD CTA:  ANTERIOR CIRCULATION: No stenosis/occlusion, aneurysm, or high flow vascular malformation. Standard [place] anatomy.    POSTERIOR CIRCULATION: No stenosis/occlusion, aneurysm, or high flow vascular malformation. Dominant left and smaller right vertebral artery contribute to a normal basilar artery.     DURAL VENOUS SINUSES: Expected enhancement of the major dural  venous sinuses.    NECK CTA:  RIGHT CAROTID: No measurable stenosis or dissection.    LEFT CAROTID: No measurable stenosis or dissection.    VERTEBRAL ARTERIES: No focal stenosis or dissection. Dominant left and smaller right vertebral arteries.    AORTIC ARCH: Classic aortic arch anatomy with no significant stenosis at the origin of the great vessels.    NONVASCULAR STRUCTURES: Unremarkable.      Impression    IMPRESSION:   HEAD CT:  1.  No acute process.    HEAD CTA:   1.  Normal CTA [place].    NECK CTA:  1.  Normal neck CTA.   CTV Head with Contrast    Narrative    EXAM: CTV HEAD WITH CONTRAST  LOCATION: [facility]  DATE: 12/7/2024    INDICATION: Headache dizziness, nausea  COMPARISON: None.  CONTRAST: 67mL Isovue 370  TECHNIQUE: Head CT venogram with IV contrast. Noncontrast head CT followed by axial helical CT images of the intracranial vessels obtained during the venous phase of intravenous contrast administration. Axial helical 2D reconstructed images and   multiplanar 3D MIP reconstructed images of the intracranial vessels were performed by the technologist. Dose reduction techniques were used.     FINDINGS:   NONCONTRAST HEAD CT:   INTRACRANIAL CONTENTS: No intracranial hemorrhage, extraaxial collection, or mass effect.  No CT evidence of acute infarct. Normal parenchymal attenuation. Normal ventricles and sulci. Empty sella.    VISUALIZED ORBITS/SINUSES/MASTOIDS: No intraorbital abnormality. No paranasal sinus mucosal disease. No middle ear or mastoid effusion.    BONES/SOFT TISSUES: No acute abnormality.    HEAD CTV:  DURAL SINUSES: No occlusion. Dominant left and smaller right transverse and sigmoid dural sinuses. Mid left transverse sinus arachnoid granulation noted. Mild-to-moderate stenosis of the distal right transverse sinus.    INTERNAL CEREBRAL VEINS: No significant stenosis or occlusion.      MAJOR CORTICAL VENOUS BRANCHES: No significant stenosis or  occlusion.      Impression    IMPRESSION:   HEAD CT:  1.  No acute process.    2.  Empty sella.    HEAD CTV:   1.  No evidence for dural sinus thrombosis.    2.  Arachnoid granulation in the mid left transverse sinus.    3.  Mild to moderate stenosis of the distal right transverse sinus.   Head CT w/o contrast    Narrative    EXAM: CT HEAD W/O CONTRAST  LOCATION: [facility]  DATE: 12/7/2024    INDICATION: headache, right sided  COMPARISON: None.  TECHNIQUE: Routine CT Head without IV contrast. Multiplanar reformats. Dose reduction techniques were used.    FINDINGS:  INTRACRANIAL CONTENTS: No intracranial hemorrhage, extraaxial collection, or mass effect.  No CT evidence of acute infarct. Normal parenchymal attenuation. Normal ventricles and sulci. Empty sella.    VISUALIZED ORBITS/SINUSES/MASTOIDS: No intraorbital abnormality. No paranasal sinus mucosal disease. No middle ear or mastoid effusion.    BONES/SOFT TISSUES: No acute abnormality.      Impression    IMPRESSION:  1.  No acute process.

## 2024-12-08 NOTE — PHARMACY-ADMISSION MEDICATION HISTORY
Pharmacist Admission Medication History    Admission medication history is complete. The information provided in this note is only as accurate as the sources available at the time of the update.    Information Source(s): Patient and CareEverywhere/SureScripts via in-person    Pertinent Information: Patient started 7 day course of cefdinir 300 mg bid on Wednesday, 12/4.    Changes made to PTA medication list:  Added: None  Deleted: cyclobenzaprine, ferrous sulfate, vitamin D  Changed: None    Allergies reviewed with patient and updates made in EHR: yes    Medication History Completed By: Jordan Farooq RP 12/7/2024 8:26 PM    PTA Med List   Medication Sig Note Last Dose/Taking    cefdinir (OMNICEF) 300 MG capsule Take 1 capsule (300 mg) by mouth 2 times daily for 7 days. 12/7/2024: Has taken since Wednesday, 12/4 12/7/2024 Morning    ondansetron (ZOFRAN ODT) 8 MG ODT tab Take 1 tablet (8 mg) by mouth every 8 hours as needed for nausea.  Past Week

## 2024-12-08 NOTE — CONSULTS
Neurology Note  [facility]          Patient Name: Nurys Soria  MRN: [id number]  : 1980      Reason for Consult: Severe headache and concerns of pseudotumor cerebri.    History  Ms. Soria is a 44 year old who was admitted on  for a bad headache.  She was seen in the emergency room in  for a bad migraine.  At that time she got a migraine cocktail including Reglan, Toradol and Decadron.  She also received an occipital nerve block which helped so was later discharged home.  She subsequently developed right-sided ear pain.  She was also having nausea and neck pain so came back to emergency room.  Of note she was also diagnosed recently with urinary tract infection and was started on antibiotics.  Problem List:   Patient Active Problem List   Diagnosis    Intractable headache, unspecified chronicity pattern, unspecified headache type       Past Surgical History:  Past Surgical History:   Procedure Laterality Date    GYN SURGERY      tubaligation\     Medications:  No current outpatient medications on file.     Allergies:  Allergies   Allergen Reactions    Nka [No Known Allergies]      No family history on file.  Social History:  Social History     Socioeconomic History    Marital status:      Spouse name: Not on file    Number of children: Not on file    Years of education: Not on file    Highest education level: Not on file   Occupational History    Not on file   Tobacco Use    Smoking status: Never    Smokeless tobacco: Never   Substance and Sexual Activity    Alcohol use: No    Drug use: Not on file    Sexual activity: Not on file   Other Topics Concern    Not on file   Social History Narrative    Not on file     Social Drivers of Health     Financial Resource Strain: Not on file   Food Insecurity: Not on file   Transportation Needs: Not on file   Physical Activity: Not on file   Stress: Not on file   Social Connections: Not on file  "  Interpersonal Safety: Not on file   Housing Stability: Not on file         Vital Signs:  /65   Pulse 75   Temp 98.4  F (36.9  C) (Oral)   Resp 16   Ht 1.575 m (5' 2\")   Wt 78.6 kg (173 lb 4.8 oz)   SpO2 100%   BMI 31.70 kg/m        Neurological examination  Mental Status: The patient is alert and oriented. Recent memory is normal. The person is  attentive with normal concentration. Language is fluent. Speech is of normal patrice and  character. The speech is nondysarthric. Fund of knowledge appears normal  Cranial Nerve I: Not tested.  Cranial Nerve II: Equally round and reactive to light.  Cranial Nerves III, IV, VI: The extraocular movements are full in all directions of gaze without  ophthalmoplegia or nystagmus.  Cranial Nerve V: Light touch is intact and symmetric in the V1, V2, V3 divisions of both  trigeminal nerves.  Cranial Nerve VII: Facial movements are symmetric.  Cranial Nerve XI: Normal shoulder shrug.  Motor: Muscle Strength: The strength was 5/5 in upper and lower extremities bilaterally.  Reflexes: The reflexes are 2/4 for biceps, patellar tendon reflexes bilaterally and  symmetrically.  Sensory: The sensory examination is normal for light touch bilaterally and symmetrically.  Cerebellar: The cerebellar examination is normal to finger to nose test.    Review of Diagnostics:    MR Brain w/o & w Contrast    Result Date: 12/8/2024  EXAM: MR BRAIN W/O and W CONTRAST LOCATION: [facility] DATE: 12/8/2024 INDICATION: Intractable headache, right ear pain COMPARISON: 12/7/2024 CONTRAST: mL [employer] TECHNIQUE: Routine multiplanar multisequence head MRI without and with intravenous contrast. FINDINGS: INTRACRANIAL CONTENTS: No acute or subacute infarct. No mass, acute hemorrhage, or extra-axial fluid collections. Normal brain parenchymal signal. Normal ventricles and sulci. Normal position of the cerebellar tonsils. No pathologic contrast enhancement. SELLA: Empty sella " morphology with flattening of the pituitary gland along the sellar floor. OSSEOUS STRUCTURES/SOFT TISSUES: Normal marrow signal. The major intracranial vascular flow voids are maintained. ORBITS: No abnormality accounting for technique. SINUSES/MASTOIDS: No paranasal sinus mucosal disease. No middle ear or mastoid effusion.     IMPRESSION: 1.  No acute findings. 2.  Empty sella morphology with flattening of the pituitary gland along the sellar floor. This is a nonspecific finding but can be seen in the setting of idiopathic intracranial hypertension. 3.  Otherwise unremarkable.     Head CT w/o contrast    Result Date: 12/7/2024  EXAM: CT HEAD W/O CONTRAST LOCATION: [facility] DATE: 12/7/2024 INDICATION: headache, right sided COMPARISON: None. TECHNIQUE: Routine CT Head without IV contrast. Multiplanar reformats. Dose reduction techniques were used. FINDINGS: INTRACRANIAL CONTENTS: No intracranial hemorrhage, extraaxial collection, or mass effect.  No CT evidence of acute infarct. Normal parenchymal attenuation. Normal ventricles and sulci. Empty sella. VISUALIZED ORBITS/SINUSES/MASTOIDS: No intraorbital abnormality. No paranasal sinus mucosal disease. No middle ear or mastoid effusion. BONES/SOFT TISSUES: No acute abnormality.     IMPRESSION: 1.  No acute process.    CTV Head with Contrast    Result Date: 12/7/2024  EXAM: CTV HEAD WITH CONTRAST LOCATION: [facility] DATE: 12/7/2024 INDICATION: Headache dizziness, nausea COMPARISON: None. CONTRAST: 67mL Isovue 370 TECHNIQUE: Head CT venogram with IV contrast. Noncontrast head CT followed by axial helical CT images of the intracranial vessels obtained during the venous phase of intravenous contrast administration. Axial helical 2D reconstructed images and multiplanar 3D MIP reconstructed images of the intracranial vessels were performed by the technologist. Dose reduction techniques were used. FINDINGS: NONCONTRAST  HEAD CT: INTRACRANIAL CONTENTS: No intracranial hemorrhage, extraaxial collection, or mass effect.  No CT evidence of acute infarct. Normal parenchymal attenuation. Normal ventricles and sulci. Empty sella. VISUALIZED ORBITS/SINUSES/MASTOIDS: No intraorbital abnormality. No paranasal sinus mucosal disease. No middle ear or mastoid effusion. BONES/SOFT TISSUES: No acute abnormality. HEAD CTV: DURAL SINUSES: No occlusion. Dominant left and smaller right transverse and sigmoid dural sinuses. Mid left transverse sinus arachnoid granulation noted. Mild-to-moderate stenosis of the distal right transverse sinus. INTERNAL CEREBRAL VEINS: No significant stenosis or occlusion.  MAJOR CORTICAL VENOUS BRANCHES: No significant stenosis or occlusion.     IMPRESSION: HEAD CT: 1.  No acute process. 2.  Empty sella. HEAD CTV: 1.  No evidence for dural sinus thrombosis. 2.  Arachnoid granulation in the mid left transverse sinus. 3.  Mild to moderate stenosis of the distal right transverse sinus.    CTA Head Neck with Contrast    Result Date: 12/7/2024  EXAM: CTA HEAD NECK W CONTRAST LOCATION: [facility] DATE: 12/7/2024 INDICATION: Right sided headache neck pain COMPARISON: None. CONTRAST: 67mL Isovue 370 TECHNIQUE: Head and neck CT angiogram with IV contrast. Noncontrast head CT followed by axial helical CT images of the head and neck vessels obtained during the arterial phase of intravenous contrast administration. Axial 2D reconstructed images and multiplanar 3D MIP reconstructed images of the head and neck vessels were performed by the technologist. Dose reduction techniques were used. All stenosis measurements made according to NASCET criteria unless otherwise specified. FINDINGS: NONCONTRAST HEAD CT: INTRACRANIAL CONTENTS: No intracranial hemorrhage, extraaxial collection, or mass effect.  No CT evidence of acute infarct. Normal parenchymal attenuation. Normal ventricles and sulci. Empty sella.  VISUALIZED ORBITS/SINUSES/MASTOIDS: No intraorbital abnormality. No paranasal sinus mucosal disease. No middle ear or mastoid effusion. BONES/SOFT TISSUES: No acute abnormality. HEAD CTA: ANTERIOR CIRCULATION: No stenosis/occlusion, aneurysm, or high flow vascular malformation. Standard [place] anatomy. POSTERIOR CIRCULATION: No stenosis/occlusion, aneurysm, or high flow vascular malformation. Dominant left and smaller right vertebral artery contribute to a normal basilar artery. DURAL VENOUS SINUSES: Expected enhancement of the major dural venous sinuses. NECK CTA: RIGHT CAROTID: No measurable stenosis or dissection. LEFT CAROTID: No measurable stenosis or dissection. VERTEBRAL ARTERIES: No focal stenosis or dissection. Dominant left and smaller right vertebral arteries. AORTIC ARCH: Classic aortic arch anatomy with no significant stenosis at the origin of the great vessels. NONVASCULAR STRUCTURES: Unremarkable.     IMPRESSION: HEAD CT: 1.  No acute process. HEAD CTA: 1.  Normal CTA [place]. NECK CTA: 1.  Normal neck CTA.    CT Head w/o Contrast    Result Date: 12/6/2024  EXAM: CT HEAD W/O CONTRAST LOCATION: [facility] DATE: 12/6/2024 INDICATION: Headache. COMPARISON: None. TECHNIQUE: Routine CT Head without IV contrast. Multiplanar reformats. Dose reduction techniques were used. FINDINGS: INTRACRANIAL CONTENTS: No intracranial hemorrhage, extraaxial collection, or mass effect.  No CT evidence of acute infarct. Normal parenchymal attenuation. Normal ventricles and sulci. Empty sella. VISUALIZED ORBITS/SINUSES/MASTOIDS: No intraorbital abnormality. No paranasal sinus mucosal disease. No middle ear or mastoid effusion. BONES/SOFT TISSUES: No acute abnormality.     IMPRESSION: 1.  No acute intracranial process. 2.  Empty sella. This is a common incidental finding, but can also be seen in setting of idiopathic intracranial hypertension, correlation needed.      Complete Blood  Count:    Recent Labs   Lab Test 12/08/24  0833 12/07/24  1728 12/05/24 2053 11/18/17  1550   WBC 10.8 10.3 8.3 7.6   RBC 4.72 5.14 5.19 4.91   HGB 13.1 13.8 14.4 13.3   HCT 38.6 42.7 42.9 40.0    291 300 268   LYMPH  --  16 23 12.4            Recent Labs   Lab Test 12/08/24  0833 12/07/24  1728   WBC 10.8 10.3   HGB 13.1 13.8   MCV 82 83    291      Recent Labs   Lab Test 12/08/24  0833 12/07/24  1728    140   POTASSIUM 4.1 3.9   CHLORIDE 108* 103   CO2 22 24   BUN 8.0 13.6   CR 0.63 0.86   ANIONGAP 8 13   CASSIE 8.5* 9.1   * 109*     CMP:  Recent Labs   Lab 12/08/24 0833 12/05/24 2053   PROTTOTAL 6.0* 7.2   ALBUMIN 3.6 4.2   ALKPHOS 81 101   AST 16 16   ALT 12 16   BILITOTAL 0.4 0.7       Impression and Plan:  Ms. Soria is a 44 year old With a past medical history of chronic migraine iron deficiency who was admitted on December 7 after she was having a bad leg along with nausea, neck pain and ear pain.  She recently was also seen in the emergency room and received a migraine cocktail along with nerve block which helped.    Plan-  -MRI brain completed showing signs of empty sella syndrome.  This could be an incidental finding or could be possibly related to intracranial hypertension.  Would need to do lumbar puncture to check opening pressure.  -CTV showed mild to moderate stenosis of the distal right transverse sinus.  Will check MRV.  This could also be an incidental finding or related to intracranial hypertension.        Mark Mata MD  Neurology    Thank you very much for allowing me to participate in the care of this patient.

## 2024-12-08 NOTE — PROGRESS NOTES
RECEIVING UNIT ED HANDOFF REVIEW    ED Nurse Handoff Report was reviewed by: Em Shah RN on December 8, 2024 at 12:25 AM

## 2024-12-09 LAB
ATRIAL RATE - MUSE: 59 BPM
CREAT SERPL-MCNC: 0.87 MG/DL (ref 0.51–0.95)
DIASTOLIC BLOOD PRESSURE - MUSE: NORMAL MMHG
EGFRCR SERPLBLD CKD-EPI 2021: 85 ML/MIN/1.73M2
INTERPRETATION ECG - MUSE: NORMAL
P AXIS - MUSE: 56 DEGREES
PR INTERVAL - MUSE: 84 MS
QRS DURATION - MUSE: 84 MS
QT - MUSE: 454 MS
QTC - MUSE: 449 MS
R AXIS - MUSE: 30 DEGREES
SYSTOLIC BLOOD PRESSURE - MUSE: NORMAL MMHG
T AXIS - MUSE: -4 DEGREES
VENTRICULAR RATE- MUSE: 59 BPM

## 2024-12-09 PROCEDURE — 99232 SBSQ HOSP IP/OBS MODERATE 35: CPT | Performed by: NURSE PRACTITIONER

## 2024-12-09 PROCEDURE — 250N000013 HC RX MED GY IP 250 OP 250 PS 637: Performed by: STUDENT IN AN ORGANIZED HEALTH CARE EDUCATION/TRAINING PROGRAM

## 2024-12-09 PROCEDURE — G0378 HOSPITAL OBSERVATION PER HR: HCPCS

## 2024-12-09 PROCEDURE — 250N000013 HC RX MED GY IP 250 OP 250 PS 637: Performed by: NURSE PRACTITIONER

## 2024-12-09 PROCEDURE — 36415 COLL VENOUS BLD VENIPUNCTURE: CPT | Performed by: INTERNAL MEDICINE

## 2024-12-09 PROCEDURE — 82565 ASSAY OF CREATININE: CPT | Performed by: INTERNAL MEDICINE

## 2024-12-09 RX ORDER — ACETAMINOPHEN 325 MG/1
650 TABLET ORAL EVERY 6 HOURS PRN
Status: DISCONTINUED | OUTPATIENT
Start: 2024-12-09 | End: 2024-12-14 | Stop reason: HOSPADM

## 2024-12-09 RX ORDER — METHOCARBAMOL 750 MG/1
750 TABLET, FILM COATED ORAL 3 TIMES DAILY
Status: DISCONTINUED | OUTPATIENT
Start: 2024-12-09 | End: 2024-12-14 | Stop reason: HOSPADM

## 2024-12-09 RX ORDER — IBUPROFEN 600 MG/1
600 TABLET, FILM COATED ORAL ONCE
Status: COMPLETED | OUTPATIENT
Start: 2024-12-09 | End: 2024-12-09

## 2024-12-09 RX ORDER — METHOCARBAMOL 750 MG/1
750 TABLET, FILM COATED ORAL 3 TIMES DAILY
Status: DISCONTINUED | OUTPATIENT
Start: 2024-12-09 | End: 2024-12-09

## 2024-12-09 RX ADMIN — METHOCARBAMOL 750 MG: 750 TABLET ORAL at 09:27

## 2024-12-09 RX ADMIN — CEFDINIR 300 MG: 300 CAPSULE ORAL at 09:27

## 2024-12-09 RX ADMIN — CEFDINIR 300 MG: 300 CAPSULE ORAL at 20:33

## 2024-12-09 RX ADMIN — ACETAMINOPHEN 650 MG: 325 TABLET, FILM COATED ORAL at 16:11

## 2024-12-09 RX ADMIN — METHOCARBAMOL 750 MG: 750 TABLET ORAL at 16:11

## 2024-12-09 RX ADMIN — IBUPROFEN 600 MG: 600 TABLET ORAL at 09:27

## 2024-12-09 RX ADMIN — METHOCARBAMOL 750 MG: 750 TABLET ORAL at 22:28

## 2024-12-09 RX ADMIN — LORAZEPAM 0.5 MG: 0.5 TABLET ORAL at 00:14

## 2024-12-09 ASSESSMENT — ACTIVITIES OF DAILY LIVING (ADL)
ADLS_ACUITY_SCORE: 43
ADLS_ACUITY_SCORE: 41
ADLS_ACUITY_SCORE: 43
ADLS_ACUITY_SCORE: 41
ADLS_ACUITY_SCORE: 43
ADLS_ACUITY_SCORE: 43
ADLS_ACUITY_SCORE: 41
ADLS_ACUITY_SCORE: 43
ADLS_ACUITY_SCORE: 41
ADLS_ACUITY_SCORE: 43
ADLS_ACUITY_SCORE: 43
ADLS_ACUITY_SCORE: 41
ADLS_ACUITY_SCORE: 41
ADLS_ACUITY_SCORE: 43
ADLS_ACUITY_SCORE: 43
ADLS_ACUITY_SCORE: 41
ADLS_ACUITY_SCORE: 43
ADLS_ACUITY_SCORE: 43

## 2024-12-09 NOTE — PROGRESS NOTES
Goal Outcome Evaluation:     Plan of Care Reviewed With: patient     Overall Patient Progress: improvingOverall Patient Progress: improving     -vital signs normal or at patient baseline  no, elevated temp, M-temp 99.2  -tolerating oral intake to maintain hydration yes  -adequate pain control on oral analgesics  yes  -returns to baseline functional status  yes  -safe disposition plan has been identified yes  .   Dx: intractable headache and R ear pain. Room with dim light and calm atmosphere. Encouraging fluids. LR infusing at 100. PT supplied abx in drawer for UTI. Reports pain in head and R ear 4/10. PRN Ativan given with some relief.

## 2024-12-09 NOTE — PROGRESS NOTES
Pt A&O x4. Independent, voiding in the bathroom. Tolerating a regular diet. On room air, PIV SL. Pain managed w/ scheduled toradol. Plan of care ongoing.

## 2024-12-09 NOTE — PROGRESS NOTES
[facility]    Medicine Progress Note - Hospitalist Service    Date of Admission:  12/7/2024    Assessment & Plan   uNrys Soria is a 44 year old female with history of iron deficiency anemia, chronic migraine who was admitted on 12/7/2024 with intractable headache. Since admission, patient has been seen by neurology noting migraine history. Recommendation from neurology for spinal for further evaluation based on imaging notable for empty sella. Lumbar puncture undecided by patient as of 12/8    Addendum 12/9/2024 5022  I spoke with neurology Dr Martin this evening. She will be seeing patient this evening. Uncertain plan, still potential for LP at this time. Possible intervention in am?     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 Idiopathic intracranial hypertension  Nausea w/out vomiting, resolved  - Zofran, compazine prn   -completed toradol q6h x4, did have some relief as of 12/9 reports awoke overnight with right side headache  -ibuprofen 600mg x 1 and scheduled robaxin with patient reporting bilateral tmj pain  -Received dose of sumatriptan   -Neurology consult re imaging finding and medical mgt of recalcitrant headache.  Dr. Pizano discussed with neurologist, he will likely recommend MRV and LP to evaluate for cerebral venous sinus thrombosis and elevated intracranial pressure.  -MRV completed on 12/8 stating  Smooth narrowing of the lateral transverse sinuses bilaterally. This can be seen in the setting of idiopathic intracranial hypertension. No thrombosis  -appreciate any further neurology recommendations 12/9. Perhaps she can discharge once known     E coli UTI   -Diagnosed 12/4/24 with UTI and sent home on antibiotics.    -Reviewed UA from 12/4 showed positive nitrates trace leukocyte  "Estrace, urine culture grew greater than 100,000 E. coli sensitive to cephalosporins.   -Completes course of cefdinir on 12/10    Hx of iron deficiency anemia   -Hgb is normal at 13.1 with MCV 82  -not on supplements          Observation Goals: -diagnostic tests and consults completed and resulted, -vital signs normal or at patient baseline, -tolerating oral intake to maintain hydration, -adequate pain control on oral analgesics, -returns to baseline functional status, -safe disposition plan has been identified, Nurse to notify provider when observation goals have been met and patient is ready for discharge.  Diet: Regular Diet Adult    DVT Prophylaxis: Pneumatic Compression Devices and Ambulate every shift  Kidd Catheter: Not present  Lines: None     Cardiac Monitoring: None  Code Status: Full Code      Clinically Significant Risk Factors Present on Admission          # Hyperchloremia: Highest Cl = 108 mmol/L in last 2 days, will monitor as appropriate      # Hypocalcemia: Lowest Ca = 8.5 mg/dL in last 2 days, will monitor and replace as appropriate                   # Obesity: Estimated body mass index is 31.7 kg/m  as calculated from the following:    Height as of this encounter: 1.575 m (5' 2\").    Weight as of this encounter: 78.6 kg (173 lb 4.8 oz).              Social Drivers of Health            Disposition Plan     Medically Ready for Discharge: Anticipated Today           The patient's care was discussed with the Attending Physician, Dr. Majano .    Naomi Severino, BETO HUYNHN [facility]  Hospitalist Service  [facility]  Securely message with [employer] (more info)  Text page via [facility] Paging/Directory   ______________________________________________________________________    Interval History   This morning patient is seen in her room resting with eyes closed upon entering but easily aroused to voice.  She is alert, oriented, very pleasant.  She states that she had episode in the late " evening/early morning hours around 1 AM waking with significant pain on right side of her head and in right ear.  She states that yesterday she had been feeling quite good without headache and started again later on in the afternoon in the evening.  She also states that she has bilateral jaw pain which appears to be in the TMJ.  She denies any current nausea or vomiting.  She denies shortness of breath or chest pain.  The only complaint that she has is of right sided head pain.  MRV was completed per neurology recommendations awaiting further insight from neurology.  Patient may be able to discharge home today if we can achieve some pain relief for her so she can rest comfortably at home.  Discussed trying muscle relaxer for the pain of TMJ perhaps this will be therapeutic as well for headache.  Otherwise she denies any dizziness or diplopia.  She denies any numbness or tingling.  Moves all extremities equally.  No neurodeficits noted.    Physical Exam   Vital Signs: Temp: 98.9  F (37.2  C) Temp src: Oral BP: 103/64 Pulse: 72   Resp: 18 SpO2: 96 % O2 Device: None (Room air)    Weight: 173 lbs 4.8 oz    Physical Exam  Constitutional:       Appearance: Normal appearance.   HENT:      Head: Normocephalic.      Mouth/Throat:      Mouth: Mucous membranes are moist.   Eyes:      Pupils: Pupils are equal, round, and reactive to light.   Cardiovascular:      Rate and Rhythm: Normal rate and regular rhythm.      Pulses: Normal pulses.      Heart sounds: Normal heart sounds.   Pulmonary:      Effort: Pulmonary effort is normal.      Breath sounds: Normal breath sounds.   Abdominal:      General: Abdomen is flat. Bowel sounds are normal.      Palpations: Abdomen is soft.   Musculoskeletal:         General: Normal range of motion.   Skin:     General: Skin is warm and dry.   Neurological:      General: No focal deficit present.      Mental Status: She is alert and oriented to person, place, and time.          Medical Decision  Making       49 MINUTES SPENT BY ME on the date of service doing chart review, history, exam, documentation & further activities per the note.      Data     I have personally reviewed the following data over the past 24 hrs:    N/A  \   N/A   / N/A     N/A N/A N/A /  N/A   N/A N/A 0.87 \       Imaging results reviewed over the past 24 hrs:   Recent Results (from the past 24 hours)   MRV Brain wo & w Contrast    Narrative    EXAM: MRV BRAIN  W/O and W CONTRAST  LOCATION: [facility]  DATE: 12/8/2024    INDICATION: abnormal CTV  COMPARISON: None.  CONTRAST: 10mL marcella  TECHNIQUE:   1) Phase contrast and 2-D time-of-flight head MRV performed after administration of intravenous contrast.    FINDINGS:    HEAD MRV:   DURAL SINUSES: There is smooth narrowing of the lateral transverse sinuses bilaterally. No thrombosis. Balanced transverse and sigmoid sinuses.    INTERNAL CEREBRAL VEINS: No significant stenosis or occlusion.      MAJOR CORTICAL VENOUS BRANCHES: No significant stenosis or occlusion.      Impression    IMPRESSION:  1.  Smooth narrowing of the lateral transverse sinuses bilaterally. This can be seen in the setting of idiopathic intracranial hypertension.  2.  No thrombosis.

## 2024-12-09 NOTE — PROGRESS NOTES
"Neurology Progress Note.    Assessment and Plan:  This is a 44 year old woman admitted with severe intractable acute on chronic headache/migraine. Patient was seen in the ER on Dec 5, got migraine treatments and ONB which helped. Then had R ear pain, neck pain. No blurred vision. Pain was different from regular migraine. MRI showed empty sella. CTV showed Arachnoid granulation in the mid left transverse sinus and mild to moderate stenosis of the distal right transverse sinus.   Patient wasn't keen on LP.   Currently treated with toradol, zofran and continues to have headache. Exam with neck tension.   -re-discussed LP and patient is in agreement - will order.   Discussed that if elevated pressures will ask radiology to drain fluid which will help with headache. If pressures are normal will order IV migraine treatments.      Will follow     Thank you for allowing me to participate in cares of this patient. Please contact me with any questions of concerns (pager [phone]).     Subjective: No acute events overnight.  Today patient reported that headaches keep coming back. Has right side head pain and bilateral jaw pains.     Objective:  /64 (BP Location: Right arm)   Pulse 72   Temp 98.9  F (37.2  C) (Oral)   Resp 18   Ht 1.575 m (5' 2\")   Wt 78.6 kg (173 lb 4.8 oz)   SpO2 96%   BMI 31.70 kg/m    General: no acute distress  Neuro:  Alert, oriented. Speech clear and language normal.  Pupils equal and reactive to light. Visual fields full to confrontation. Extra-ocular movements without restrictions.  Face symmetric. Facial sensation normal.  Hearing intact bilaterally to voice.  Palate elevates symmetrically. Tongue midline. Shoulder shrug is equal bilaterally.  Motor:  Normal strength in all four extremities.   Sensory: normal to light touch bilaterally.  DTR: Toes downgoing bilaterally.   Coordination: normal finger-to-nose bilaterally.  Gait: not tested  Neck: tension worse on the right    I have " personally reviewed all the labs and imaging studies. Pertinent findings:   IMPRESSION: HEAD CT: 1. No acute process. 2. Empty sella. HEAD CTV: 1. No evidence for dural sinus thrombosis. 2. Arachnoid granulation in the mid left transverse sinus. 3. Mild to moderate stenosis of the distal right transverse sinus.     MRI brain:  IMPRESSION:  1.  No acute findings.  2.  Empty sella morphology with flattening of the pituitary gland along the sellar floor. This is a nonspecific finding but can be seen in the setting of idiopathic intracranial hypertension.  3.  Otherwise unremarkable.    MRV:  IMPRESSION:  1.  Smooth narrowing of the lateral transverse sinuses bilaterally. This can be seen in the setting of idiopathic intracranial hypertension.  2.  No thrombosis.    Total time of visit: 35 minutes with the time spent on chart review, imaging and lab results review, and more than 50 % of the time spent on coordination of cares and face-to-face time with the patient including counseling regarding pathophysiology of the above conditions, results of the tests and further directions of care.     Corry Martin MD  [facility]

## 2024-12-09 NOTE — PLAN OF CARE
Goal Outcome Evaluation:      Plan of Care Reviewed With: patient    Overall Patient Progress: improvingOverall Patient Progress: improving         Shift: 12/9/24, 4602-8138  POD#/Summary: admitted 12/7 with intractable headache and R ear pain    Orientation: A&Ox4  Vital Signs: VSS on RA  Pain Management: Scheduled advil and robaxin, pain improved from 4/10 to 0/10   Bowel/Bladder: Continent and independent in room   IV: R PIV - SL  Diet: Regular   Activity Level: Independent  Anticipated Discharge Date/Plan: Pending

## 2024-12-10 ENCOUNTER — APPOINTMENT (OUTPATIENT)
Dept: GENERAL RADIOLOGY | Facility: CLINIC | Age: 44
DRG: 075 | End: 2024-12-10
Attending: PSYCHIATRY & NEUROLOGY
Payer: COMMERCIAL

## 2024-12-10 ENCOUNTER — APPOINTMENT (OUTPATIENT)
Dept: GENERAL RADIOLOGY | Facility: CLINIC | Age: 44
DRG: 075 | End: 2024-12-10
Attending: NURSE PRACTITIONER
Payer: COMMERCIAL

## 2024-12-10 LAB
APPEARANCE CSF: CLEAR
C GATTII+NEOFOR DNA CSF QL NAA+NON-PROBE: NEGATIVE
CMV DNA CSF QL NAA+NON-PROBE: NEGATIVE
COLOR CSF: COLORLESS
CRYPTOC AG SPEC QL: NEGATIVE
CRYPTOC AG SPEC QL: NEGATIVE
E COLI K1 AG CSF QL: NEGATIVE
EV RNA SPEC QL NAA+PROBE: NEGATIVE
GLUCOSE BLDC GLUCOMTR-MCNC: 109 MG/DL (ref 70–99)
GLUCOSE CSF-MCNC: 37 MG/DL (ref 40–70)
GP B STREP DNA CSF QL NAA+NON-PROBE: NEGATIVE
HAEM INFLU DNA CSF QL NAA+NON-PROBE: NEGATIVE
HHV6 DNA CSF QL NAA+NON-PROBE: NEGATIVE
HIV 1+2 AB+HIV1 P24 AG SERPL QL IA: NONREACTIVE
HSV1 DNA CSF QL NAA+NON-PROBE: NEGATIVE
HSV2 DNA CSF QL NAA+NON-PROBE: NEGATIVE
L MONOCYTOG DNA CSF QL NAA+NON-PROBE: NEGATIVE
N MEN DNA CSF QL NAA+NON-PROBE: NEGATIVE
PARECHOVIRUS A RNA CSF QL NAA+NON-PROBE: NEGATIVE
PATH INTERP SPEC-IMP: NORMAL
PATH INTERP SPEC-IMP: NORMAL
PROT CSF-MCNC: 115.4 MG/DL (ref 15–45)
RBC # CSF MANUAL: 5 /UL (ref 0–2)
S PNEUM DNA CSF QL NAA+NON-PROBE: NEGATIVE
SPECIMEN TYPE: NORMAL
SPECIMEN TYPE: NORMAL
TUBE # CSF: 4
VZV DNA CSF QL NAA+NON-PROBE: POSITIVE
WBC # CSF MANUAL: 430 /UL (ref 0–5)

## 2024-12-10 PROCEDURE — 82164 ANGIOTENSIN I ENZYME TEST: CPT | Performed by: PSYCHIATRY & NEUROLOGY

## 2024-12-10 PROCEDURE — 250N000011 HC RX IP 250 OP 636: Performed by: NURSE PRACTITIONER

## 2024-12-10 PROCEDURE — 258N000003 HC RX IP 258 OP 636: Performed by: NURSE PRACTITIONER

## 2024-12-10 PROCEDURE — 86481 TB AG RESPONSE T-CELL SUSP: CPT | Performed by: PSYCHIATRY & NEUROLOGY

## 2024-12-10 PROCEDURE — 87205 SMEAR GRAM STAIN: CPT | Performed by: PSYCHIATRY & NEUROLOGY

## 2024-12-10 PROCEDURE — 89051 BODY FLUID CELL COUNT: CPT | Performed by: PSYCHIATRY & NEUROLOGY

## 2024-12-10 PROCEDURE — 87040 BLOOD CULTURE FOR BACTERIA: CPT | Performed by: NURSE PRACTITIONER

## 2024-12-10 PROCEDURE — 62328 DX LMBR SPI PNXR W/FLUOR/CT: CPT

## 2024-12-10 PROCEDURE — 71045 X-RAY EXAM CHEST 1 VIEW: CPT

## 2024-12-10 PROCEDURE — 84157 ASSAY OF PROTEIN OTHER: CPT | Performed by: PSYCHIATRY & NEUROLOGY

## 2024-12-10 PROCEDURE — 250N000011 HC RX IP 250 OP 636: Performed by: PHYSICIAN ASSISTANT

## 2024-12-10 PROCEDURE — 250N000013 HC RX MED GY IP 250 OP 250 PS 637: Performed by: NURSE PRACTITIONER

## 2024-12-10 PROCEDURE — 86592 SYPHILIS TEST NON-TREP QUAL: CPT | Performed by: PSYCHIATRY & NEUROLOGY

## 2024-12-10 PROCEDURE — 82945 GLUCOSE OTHER FLUID: CPT | Performed by: PSYCHIATRY & NEUROLOGY

## 2024-12-10 PROCEDURE — 250N000013 HC RX MED GY IP 250 OP 250 PS 637: Performed by: STUDENT IN AN ORGANIZED HEALTH CARE EDUCATION/TRAINING PROGRAM

## 2024-12-10 PROCEDURE — 258N000003 HC RX IP 258 OP 636: Performed by: PHYSICIAN ASSISTANT

## 2024-12-10 PROCEDURE — 86778 TOXOPLASMA ANTIBODY IGM: CPT | Performed by: PSYCHIATRY & NEUROLOGY

## 2024-12-10 PROCEDURE — 82962 GLUCOSE BLOOD TEST: CPT

## 2024-12-10 PROCEDURE — 96372 THER/PROPH/DIAG INJ SC/IM: CPT | Performed by: PSYCHIATRY & NEUROLOGY

## 2024-12-10 PROCEDURE — 87101 SKIN FUNGI CULTURE: CPT | Performed by: PSYCHIATRY & NEUROLOGY

## 2024-12-10 PROCEDURE — 87070 CULTURE OTHR SPECIMN AEROBIC: CPT | Performed by: PSYCHIATRY & NEUROLOGY

## 2024-12-10 PROCEDURE — 96375 TX/PRO/DX INJ NEW DRUG ADDON: CPT

## 2024-12-10 PROCEDURE — 99233 SBSQ HOSP IP/OBS HIGH 50: CPT | Performed by: NURSE PRACTITIONER

## 2024-12-10 PROCEDURE — 87389 HIV-1 AG W/HIV-1&-2 AB AG IA: CPT | Performed by: SPECIALIST

## 2024-12-10 PROCEDURE — 250N000009 HC RX 250: Performed by: PSYCHIATRY & NEUROLOGY

## 2024-12-10 PROCEDURE — 009U3ZX DRAINAGE OF SPINAL CANAL, PERCUTANEOUS APPROACH, DIAGNOSTIC: ICD-10-PCS | Performed by: PHYSICIAN ASSISTANT

## 2024-12-10 PROCEDURE — 87483 CNS DNA AMP PROBE TYPE 12-25: CPT | Performed by: PSYCHIATRY & NEUROLOGY

## 2024-12-10 PROCEDURE — G0378 HOSPITAL OBSERVATION PER HR: HCPCS

## 2024-12-10 PROCEDURE — 36415 COLL VENOUS BLD VENIPUNCTURE: CPT | Performed by: SPECIALIST

## 2024-12-10 PROCEDURE — 87899 AGENT NOS ASSAY W/OPTIC: CPT | Performed by: SPECIALIST

## 2024-12-10 PROCEDURE — 96376 TX/PRO/DX INJ SAME DRUG ADON: CPT

## 2024-12-10 RX ORDER — CEFTRIAXONE 2 G/1
2 INJECTION, POWDER, FOR SOLUTION INTRAMUSCULAR; INTRAVENOUS EVERY 12 HOURS
Status: DISCONTINUED | OUTPATIENT
Start: 2024-12-10 | End: 2024-12-11

## 2024-12-10 RX ORDER — MAGNESIUM SULFATE HEPTAHYDRATE 40 MG/ML
2 INJECTION, SOLUTION INTRAVENOUS ONCE
Status: DISCONTINUED | OUTPATIENT
Start: 2024-12-10 | End: 2024-12-10

## 2024-12-10 RX ORDER — LIDOCAINE HYDROCHLORIDE 10 MG/ML
5 INJECTION, SOLUTION EPIDURAL; INFILTRATION; INTRACAUDAL; PERINEURAL ONCE
Status: COMPLETED | OUTPATIENT
Start: 2024-12-10 | End: 2024-12-10

## 2024-12-10 RX ORDER — DEXAMETHASONE SODIUM PHOSPHATE 4 MG/ML
6 INJECTION, SOLUTION INTRA-ARTICULAR; INTRALESIONAL; INTRAMUSCULAR; INTRAVENOUS; SOFT TISSUE EVERY 6 HOURS
Status: COMPLETED | OUTPATIENT
Start: 2024-12-10 | End: 2024-12-11

## 2024-12-10 RX ADMIN — METHOCARBAMOL 750 MG: 750 TABLET ORAL at 21:38

## 2024-12-10 RX ADMIN — VANCOMYCIN HYDROCHLORIDE 1500 MG: 10 INJECTION, POWDER, LYOPHILIZED, FOR SOLUTION INTRAVENOUS at 18:54

## 2024-12-10 RX ADMIN — CEFDINIR 300 MG: 300 CAPSULE ORAL at 19:49

## 2024-12-10 RX ADMIN — ACETAMINOPHEN 650 MG: 325 TABLET, FILM COATED ORAL at 02:04

## 2024-12-10 RX ADMIN — DEXAMETHASONE SODIUM PHOSPHATE 6 MG: 4 INJECTION, SOLUTION INTRAMUSCULAR; INTRAVENOUS at 21:38

## 2024-12-10 RX ADMIN — ACYCLOVIR SODIUM 800 MG: 500 INJECTION, SOLUTION INTRAVENOUS at 22:41

## 2024-12-10 RX ADMIN — CEFTRIAXONE SODIUM 2 G: 2 INJECTION, POWDER, FOR SOLUTION INTRAMUSCULAR; INTRAVENOUS at 21:58

## 2024-12-10 RX ADMIN — LIDOCAINE HYDROCHLORIDE 2 ML: 10 INJECTION, SOLUTION EPIDURAL; INFILTRATION; INTRACAUDAL; PERINEURAL at 10:25

## 2024-12-10 RX ADMIN — ACYCLOVIR SODIUM 800 MG: 500 INJECTION, SOLUTION INTRAVENOUS at 15:53

## 2024-12-10 RX ADMIN — CEFDINIR 300 MG: 300 CAPSULE ORAL at 08:56

## 2024-12-10 RX ADMIN — METHOCARBAMOL 750 MG: 750 TABLET ORAL at 17:42

## 2024-12-10 RX ADMIN — CEFTRIAXONE SODIUM 2 G: 2 INJECTION, POWDER, FOR SOLUTION INTRAMUSCULAR; INTRAVENOUS at 17:42

## 2024-12-10 RX ADMIN — DEXAMETHASONE SODIUM PHOSPHATE 6 MG: 4 INJECTION, SOLUTION INTRAMUSCULAR; INTRAVENOUS at 17:42

## 2024-12-10 RX ADMIN — METHOCARBAMOL 750 MG: 750 TABLET ORAL at 08:56

## 2024-12-10 RX ADMIN — ACETAMINOPHEN 650 MG: 325 TABLET, FILM COATED ORAL at 08:59

## 2024-12-10 ASSESSMENT — ACTIVITIES OF DAILY LIVING (ADL)
ADLS_ACUITY_SCORE: 43

## 2024-12-10 NOTE — PHARMACY-VANCOMYCIN DOSING SERVICE
Pharmacy Vancomycin Initial Note  Date of Service December 10, 2024  Patient's  1980  44 year old, female    Indication: Meningitis    Current estimated CrCl = Estimated Creatinine Clearance: 81.8 mL/min (based on SCr of 0.87 mg/dL).    Creatinine for last 3 days  2024:  5:28 PM Creatinine 0.86 mg/dL  2024:  8:33 AM Creatinine 0.63 mg/dL  2024:  6:52 AM Creatinine 0.87 mg/dL               Nephrotoxins and other renal medications (From now, onward)      Start     Dose/Rate Route Frequency Ordered Stop    12/10/24 1730  vancomycin (VANCOCIN) 1,500 mg in 0.9% NaCl 265 mL intermittent infusion         1,500 mg  over 90 Minutes Intravenous EVERY 8 HOURS 12/10/24 1719      12/10/24 1430  acyclovir (ZOVIRAX) 800 mg in sodium chloride 0.9 % 266 mL intermittent infusion         10 mg/kg × 78.6 kg  266 mL/hr over 1 Hours Intravenous EVERY 8 HOURS 12/10/24 1419              Contrast Orders - past 72 hours (72h ago, onward)      Start     Dose/Rate Route Frequency Stop    24 1830  gadobutrol (GADAVIST) injection 10 mL         10 mL Intravenous ONCE 24 1820    24 0100  gadobutrol (GADAVIST) injection 7 mL         7 mL Intravenous ONCE 24 0112    24 1810  iopamidol (ISOVUE-370) solution 67 mL         67 mL Intravenous ONCE 24 1815                  Plan:  Start vancomycin  1500 mg IV q8h.   Vancomycin monitoring method: Trough (Method 2 = manual dose calculation)  Vancomycin therapeutic monitoring goal: 15-20 mg/L  Pharmacy will check vancomycin levels as appropriate in 1-3 Days.    Serum creatinine levels will be ordered daily for the first week of therapy and at least twice weekly for subsequent weeks.      Neelam Hurley, GabrielD

## 2024-12-10 NOTE — PROGRESS NOTES
RADIOLOGY PROCEDURE NOTE  Patient name: Nurys Soria  MRN: [id number]  : 1980    Pre-procedure diagnosis: Possible Intracranial hypertension. Headache  Post-procedure diagnosis: Same    Procedure Date/Time: December 10, 2024  10:38 AM  Procedure: LP at L2-L3  Estimated blood loss: None  Specimen(s) collected with description: ~15 ml of clear csf  The patient tolerated the procedure well with no immediate complications.  Significant findings: Opening pressure of 23 cm of water    See imaging dictation for procedural details.    Provider name: Fede Rowan PA-C  Assistant(s):None

## 2024-12-10 NOTE — PROGRESS NOTES
"Neurology Progress Note.    Assessment and Plan:  This is a 44 year old woman admitted with severe intractable acute on chronic headache/migraine. Patient was seen in the ER on Dec 5, got migraine treatments and ONB which helped. Then had R ear pain, neck pain. No blurred vision. Pain was different from regular migraine. MRI showed empty sella. CTV showed Arachnoid granulation in the mid left transverse sinus and mild to moderate stenosis of the distal right transverse sinus.   Patient was treated with toradol, zofran and continued to have headache. Exam with neck tension.   Re-discussed LP to rule out IIH and patient agreed.     Opening pressures were min elevated, however, CSF lab work came back with low glucose, very high protein and elevated lymphocytes concerning for meningitis.   -will add more labs  -ID consult  -antibiotic/antiviral coverage for now and will hold off migraine treatments    [Discussed that if elevated pressures will ask radiology to drain fluid which will help with headache. If pressures are normal will order IV migraine treatments.]        Thank you for allowing me to participate in cares of this patient. Please contact me with any questions of concerns (pager [phone]).     Subjective: No acute events overnight.  Today patient reported that actually feeling better, headache 2/10. We discussed her LP results.     Objective:  /65 (BP Location: Right arm)   Pulse 76   Temp 99.2  F (37.3  C) (Oral)   Resp 18   Ht 1.575 m (5' 2\")   Wt 78.6 kg (173 lb 4.8 oz)   SpO2 96%   BMI 31.70 kg/m    General: no acute distress  Neuro:  No change compared to yesterday's exam    I have personally reviewed all the labs and imaging studies. Pertinent findings:   Component      Latest Ref Rng 12/10/2024  10:31 AM 12/10/2024  10:32 AM   Tube Number  4    Color CSF      Colorless   Colorless    Appearance CSF      Clear   Clear    Total Nucleated Cells      0 - 5 /uL  430 (H)    RBC CSF      0 - 2 " /uL  5 (H)    % Neutrophils CSF      %  3 (P)   % Lymphocytes CSF      %  90 (P)   % Mono/Macros CSF      %  6 (P)   % Eosinophils CSF      %  1 (P)   Glucose CSF      40 - 70 mg/dL 37 (LL)     Protein total CSF      15.0 - 45.0 mg/dL 115.4 (H)        Legend:  (LL) Low Panic  (H) High  (P) Preliminary  4+ WBC seen   Predominantly mononuclear cells       Cryptococcal Antigen  Negative Negative     Cryptococcal titer interpretation    Comment: Titer testing not performed due to negative/invalid result.    Cryptococcal Antigen Specimen Type Cerebrospinal fluid       Total time of visit: 50 minutes with the time spent on chart review, imaging and lab results review, and more than 50 % of the time spent on coordination of cares and face-to-face time with the patient including counseling regarding pathophysiology of the above conditions, results of the tests and further directions of care.     Corry Martin MD  [facility]

## 2024-12-10 NOTE — PROGRESS NOTES
[facility]    Medicine Progress Note - Hospitalist Service    Date of Admission:  12/7/2024    Assessment & Plan   Nurys Soria is a 44 year old female with history of iron deficiency anemia, chronic migraine who was admitted on 12/7/2024 with intractable headache. Since admission, patient has been seen by neurology noting migraine history. Recommendation from neurology for spinal for further evaluation based on imaging notable for empty sella. Lumbar puncture undecided by patient as of 12/8    Addendum 12/10/2024 1400  Spoke with neurologist Dr. Martin regarding CSF findings and opening pressure.  Recommendations from neurology is IV magnesium 2 g x 1, Solu-Medrol 1 g IV x 1, and Depakote IV 1 g x 1 for now.  Appreciate any further recommendations from neurology continuing to follow.  Interesting outlying finding of CSF with glucose slightly low at 37.      Addendum 2 5810  -Further review of patient's CSF with findings concerning for acute meningitis, appearance of likely viral with elevated nucleated cells, protein. Infectious disease consulted. In the meantime will provide for treatment broad with acyclovir, ceftriaxone, vancomycin, and decadron.   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 Idiopathic intracranial hypertension  Nausea w/out vomiting, resolved  - Zofran, compazine prn   -completed toradol q6h x4, did have some relief as of 12/9 reports awoke overnight with right side headache  -ibuprofen 600mg x 1 and scheduled robaxin with patient reporting bilateral tmj pain  -Received dose of sumatriptan   -Neurology consult re imaging finding and medical mgt of recalcitrant  headache.  Dr. Pizano discussed with neurologist, he will likely recommend MRV and LP to evaluate for cerebral venous sinus thrombosis and elevated intracranial pressure.  -MRV completed on 12/8 stating  Smooth narrowing of the lateral transverse sinuses bilaterally. This can be seen in the setting of idiopathic intracranial hypertension. No thrombosis  -appreciate any further neurology recommendations 12/9. Perhaps she can discharge once known  -Neurology ordered LP for opening pressures and possible removal of CSF if high. Neurology to review thereafter and determine any additional migraine medications if pressures are normal this is as of 12/10  -suspected viral meningitis with elevated nucleated cells and protein in CSF findings.   -infectious disease consult   -initiated acyclovir 10mg/kg IV, ceftriaxone, vancomycin, decadron   -blood cultures prior to antibiotics   -chest xray.      E coli UTI   -Diagnosed 12/4/24 with UTI and sent home on antibiotics.    -Reviewed UA from 12/4 showed positive nitrates trace leukocyte Estrace, urine culture grew greater than 100,000 E. coli sensitive to cephalosporins.   -Completes course of cefdinir on 12/10    Hx of iron deficiency anemia   -Hgb is normal at 13.1 with MCV 82  -not on supplements          Observation Goals: -diagnostic tests and consults completed and resulted, -vital signs normal or at patient baseline, -tolerating oral intake to maintain hydration, -adequate pain control on oral analgesics, -returns to baseline functional status, -safe disposition plan has been identified, Nurse to notify provider when observation goals have been met and patient is ready for discharge.  Diet: Regular Diet Adult    DVT Prophylaxis: Pneumatic Compression Devices  Kidd Catheter: Not present  Lines: None     Cardiac Monitoring: None  Code Status: Full Code      Clinically Significant Risk Factors Present on Admission          # Hyperchloremia: Highest Cl = 108 mmol/L in last 2  "days, will monitor as appropriate      # Hypocalcemia: Lowest Ca = 8.5 mg/dL in last 2 days, will monitor and replace as appropriate                   # Obesity: Estimated body mass index is 31.7 kg/m  as calculated from the following:    Height as of this encounter: 1.575 m (5' 2\").    Weight as of this encounter: 78.6 kg (173 lb 4.8 oz).              Social Drivers of Health            Disposition Plan     Medically Ready for Discharge: Anticipated Tomorrow           The patient's care was discussed with the Attending Physician, Dr. Majano .    Naomi Severino, BETO APRN [facility]  Hospitalist Service  [facility]  Securely message with [employer] (more info)  Text page via [facility] Paging/Directory   ______________________________________________________________________    Interval History   This morning, patient seen in her room with her  at bedside.  Patient is alert, oriented, no distress noted.  Patient states that her pain in her head is much improved she is having localized ear pain on the right side at this time only.  TMs checked and clear bilaterally.  Patient denies any shortness of breath or chest pain.  No neurodeficits noted.  Explained neurology plan for LP today with further treatment recommendations thereafter from neurology team.  Patient is agreeable with this plan.    Physical Exam   Vital Signs: Temp: 97.9  F (36.6  C) Temp src: Oral BP: 104/68 Pulse: 89   Resp: 18 SpO2: 96 % O2 Device: None (Room air)    Weight: 173 lbs 4.8 oz    Physical Exam  Constitutional:       Appearance: Normal appearance.   HENT:      Head: Normocephalic.      Right Ear: Tympanic membrane, ear canal and external ear normal.      Left Ear: Tympanic membrane, ear canal and external ear normal.      Mouth/Throat:      Mouth: Mucous membranes are moist.   Eyes:      Pupils: Pupils are equal, round, and reactive to light.   Cardiovascular:      Rate and Rhythm: Normal rate and regular rhythm. "   Pulmonary:      Effort: Pulmonary effort is normal.      Breath sounds: Normal breath sounds.   Abdominal:      General: Abdomen is flat.      Palpations: Abdomen is soft.   Musculoskeletal:         General: Normal range of motion.      Cervical back: Normal range of motion.   Skin:     General: Skin is warm and dry.   Neurological:      General: No focal deficit present.      Mental Status: She is alert and oriented to person, place, and time. Mental status is at baseline.   Psychiatric:         Mood and Affect: Mood normal.          Medical Decision Making       54 MINUTES SPENT BY ME on the date of service doing chart review, history, exam, documentation & further activities per the note.

## 2024-12-10 NOTE — PLAN OF CARE
Goal Outcome Evaluation:  Plan of Care Reviewed With: patient     Overall Patient Progress: improvingOverall Patient Progress: improving     -vital signs normal or at patient baseline  yes  -tolerating oral intake to maintain hydration yes  -adequate pain control on oral analgesics  yes  -returns to baseline functional status  no  -safe disposition plan has been identified no      PRN tylenol given for pain overnight. Plan for lumbar puncture

## 2024-12-10 NOTE — PLAN OF CARE
Goal Outcome Evaluation:      Plan of Care Reviewed With: patient    Overall Patient Progress: improvingOverall Patient Progress: improving         Shift: 12/10/24, [id number]  POD#/Summary: Admitted 12/7 for intractable headache and R ear pain.     Orientation: A&Ox4  Vital Signs: VSS on RA  Labs: Critical lab for CSF, glucose: 37, providers notified.  Pain Management: Tylenol, robaxin  Bowel/Bladder: Continent  IV: R PIV - SL  Diet: Regular  Activity Level: Independent   Tests/Procedures: Lumbar Puncture this morning, see results/notes  Anticipated Discharge Date/Plan: Pending  Significant Information: Still waiting for updates as far as plan for patient from provider. Dr. Martin mentioned she would be by at some point to speak with patient. Patient has requested an  to be present (rupali).

## 2024-12-11 PROBLEM — R93.0 ABNORMAL CT OF THE HEAD: Status: ACTIVE | Noted: 2024-12-11

## 2024-12-11 LAB
GAMMA INTERFERON BACKGROUND BLD IA-ACNC: 0.1 IU/ML
M TB IFN-G BLD-IMP: NEGATIVE
M TB IFN-G CD4+ BCKGRND COR BLD-ACNC: 9.9 IU/ML
MITOGEN IGNF BCKGRD COR BLD-ACNC: 0.17 IU/ML
MITOGEN IGNF BCKGRD COR BLD-ACNC: 0.17 IU/ML
QUANTIFERON MITOGEN: 10 IU/ML
QUANTIFERON NIL TUBE: 0.1 IU/ML
QUANTIFERON TB1 TUBE: 0.27 IU/ML
QUANTIFERON TB2 TUBE: 0.27

## 2024-12-11 PROCEDURE — G0378 HOSPITAL OBSERVATION PER HR: HCPCS

## 2024-12-11 PROCEDURE — 250N000011 HC RX IP 250 OP 636: Performed by: PHYSICIAN ASSISTANT

## 2024-12-11 PROCEDURE — 250N000013 HC RX MED GY IP 250 OP 250 PS 637: Performed by: NURSE PRACTITIONER

## 2024-12-11 PROCEDURE — 96376 TX/PRO/DX INJ SAME DRUG ADON: CPT

## 2024-12-11 PROCEDURE — 250N000011 HC RX IP 250 OP 636: Performed by: NURSE PRACTITIONER

## 2024-12-11 PROCEDURE — 99233 SBSQ HOSP IP/OBS HIGH 50: CPT | Performed by: INTERNAL MEDICINE

## 2024-12-11 PROCEDURE — 99222 1ST HOSP IP/OBS MODERATE 55: CPT | Performed by: SPECIALIST

## 2024-12-11 PROCEDURE — 250N000013 HC RX MED GY IP 250 OP 250 PS 637: Performed by: INTERNAL MEDICINE

## 2024-12-11 PROCEDURE — 120N000001 HC R&B MED SURG/OB

## 2024-12-11 PROCEDURE — 258N000003 HC RX IP 258 OP 636: Performed by: PHYSICIAN ASSISTANT

## 2024-12-11 PROCEDURE — 258N000003 HC RX IP 258 OP 636: Performed by: NURSE PRACTITIONER

## 2024-12-11 RX ADMIN — METHOCARBAMOL 750 MG: 750 TABLET ORAL at 08:24

## 2024-12-11 RX ADMIN — CEFTRIAXONE SODIUM 2 G: 2 INJECTION, POWDER, FOR SOLUTION INTRAMUSCULAR; INTRAVENOUS at 08:24

## 2024-12-11 RX ADMIN — METHOCARBAMOL 750 MG: 750 TABLET ORAL at 22:38

## 2024-12-11 RX ADMIN — DEXAMETHASONE SODIUM PHOSPHATE 6 MG: 4 INJECTION, SOLUTION INTRAMUSCULAR; INTRAVENOUS at 02:10

## 2024-12-11 RX ADMIN — VANCOMYCIN HYDROCHLORIDE 1500 MG: 10 INJECTION, POWDER, LYOPHILIZED, FOR SOLUTION INTRAVENOUS at 02:10

## 2024-12-11 RX ADMIN — Medication 5 MG: at 22:38

## 2024-12-11 RX ADMIN — DEXAMETHASONE SODIUM PHOSPHATE 6 MG: 4 INJECTION, SOLUTION INTRAMUSCULAR; INTRAVENOUS at 08:24

## 2024-12-11 RX ADMIN — ACYCLOVIR SODIUM 600 MG: 50 INJECTION, SOLUTION INTRAVENOUS at 14:41

## 2024-12-11 RX ADMIN — ACYCLOVIR SODIUM 800 MG: 500 INJECTION, SOLUTION INTRAVENOUS at 06:28

## 2024-12-11 RX ADMIN — ACYCLOVIR SODIUM 600 MG: 50 INJECTION, SOLUTION INTRAVENOUS at 23:58

## 2024-12-11 ASSESSMENT — ACTIVITIES OF DAILY LIVING (ADL)
ADLS_ACUITY_SCORE: 43

## 2024-12-11 NOTE — PLAN OF CARE
Goal Outcome Evaluation:    Date & Time: 12/09/24 9147-9107   Surgery/POD#: 1 LP / Intractable Headache  Behavior & Aggression: Green  Fall Risk: No  Orientation: A&ox4   ABNL VS/O2: VSS on RA   ABNL Labs: See chart   Pain Management: Headache improved overnight per patient.   Bowel/Bladder: Continent of bowel/bladder  Drains: Reg   Wounds/incisions: LP site otherwise WDL  Diet: Regular diet   Activity Level:  Independent  Tests/Procedures: n/a   Anticipated  DC Date: Pending   Significant Information: No acute events overnight. Encephalitis panel positive for Zoster. Standard precautions.      respiratory failure

## 2024-12-11 NOTE — CONSULTS
Writer met with patient at bedside and used  services to talk through paper work she needed for FMLA. Patient works for [employer] and if she misses more then three days of work, she needs paper work filled out to explain why. Writer took the paper work and filled out some of it, requested provider to sign. Provider will look it over tomorrow when a more concrete plan is known.

## 2024-12-11 NOTE — PROGRESS NOTES
"Neurology Progress Note.    Assessment and Plan:  This is a 44 year old woman admitted with severe intractable acute on chronic headache/migraine. Patient was seen in the ER on Dec 5, got migraine treatments and ONB which helped. Then had R ear pain, neck pain. No blurred vision. Pain was different from regular migraine. MRI showed empty sella. CTV showed Arachnoid granulation in the mid left transverse sinus and mild to moderate stenosis of the distal right transverse sinus.   Patient was treated with toradol, zofran and continued to have headache. Exam with neck tension.   Re-discussed LP to rule out IIH and patient agreed.     Opening pressures were min elevated, however, CSF lab work came back with low glucose, very high protein and elevated lymphocytes concerning for meningitis. Encephalitis/meningitis panel came back positive for zoster. Several labs are still pending.   -appreciate ID help  -patient is on acyclovir now and doing well    Will sign off, please call with questions. I'll follow up on pending labs      Thank you for allowing me to participate in cares of this patient. Please contact me with any questions of concerns (pager [phone]).     Subjective: No acute events overnight.  Today patient reported that is doing much better.     Objective:  /88 (BP Location: Right arm)   Pulse 91   Temp 97.3  F (36.3  C) (Oral)   Resp 18   Ht 1.575 m (5' 2\")   Wt 78.6 kg (173 lb 4.8 oz)   SpO2 96%   BMI 31.70 kg/m    General: no acute distress  Neuro:  Exam deferred due to counseling time    I have personally reviewed all the labs and imaging studies. Pertinent findings: PENDING: VDRL CSF, ACE - CSF, fungal culture CSF, quantiferon, toxoplasma    Component      Latest Ref Rng 12/10/2024  10:32 AM   Escherichia coli K1      Negative  Negative    Haemophilus influenzae      Negative  Negative    Listeria monocytogenes      Negative  Negative    Neisseria meningitidis      Negative  Negative  " Please send pt to Er   Streptococcus agalactiae (GBS)      Negative  Negative    Streptococcus pneumoniae      Negative  Negative    Cytomegalovirus      Negative  Negative    Enterovirus by PCR      Negative  Negative    Herpes Simplex Virus 1      Negative  Negative    Herpes Simplex Virus 2      Negative  Negative    Human Herpes Virus 6      Negative  Negative    Human Parechovirus      Negative  Negative    Varicella Zoster Virus      Negative  Positive !    Cryptococcus neoformans/gattii      Negative  Negative       Legend:  ! Abnormal    Component      Latest Ref Rng 12/10/2024  3:14 PM 12/10/2024  3:56 PM   Cryptococcal Antigen Test      Negative  Negative     Cryptococcal titer interpretation --     Cryptococcal Antigen Specimen Type Blood     HIV Antigen Antibody Combo      Nonreactive   Nonreactive          Total time of visit: 35 minutes with the time spent on chart review, imaging and lab results review, and more than 50 % of the time spent on coordination of cares and face-to-face time with the patient including counseling regarding pathophysiology of the above conditions, results of the tests and further directions of care.     Corry Martin MD  [facility]

## 2024-12-11 NOTE — PROGRESS NOTES
[facility] [place]    Medicine Progress Note - Hospitalist Service    Date of Admission:  12/7/2024    Assessment & Plan   Nurys Soria is a 44 year old female with history of iron deficiency anemia, chronic migraine who was admitted on 12/7/2024 with intractable headache. Since admission, patient has been seen by neurology noting migraine history. Recommendation from neurology for spinal for further evaluation based on imaging notable for empty sella. 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 Idiopathic intracranial hypertension  MRV completed on 12/8 stating  Smooth narrowing of the lateral transverse sinuses bilaterally. This can be seen in the setting of idiopathic intracranial hypertension. No thrombosis  Underwent LP on 12/10 due continued headache and consistent with meningitis.  PCR panel positive for Varicella Zosyer   - PRN analgesics   - Vancomycin and Ceftriaxone initially started.  Discontinued   - Acyclovir started and will continue  - Neurology following and appreciate their recommendations   - ID following and appreciate their recommendations as well    - Stopped antibiotics    - Continue IV Acyclovir      Recent E coli UTI   Diagnosed 12/4/24 with UTI and sent home on antibiotics.  UA from 12/4 showed positive nitrates trace leukocyte Estrace, urine culture grew greater than 100,000 E. coli sensitive to cephalosporins.   - Completed course of antibiotics      Hx of iron deficiency anemia   Hgb is normal at 13.1 with MCV 82.  Not on supplements           Diet: Regular Diet Adult    DVT Prophylaxis: Pneumatic Compression Devices  Kidd Catheter: Not present  Lines: None     Cardiac Monitoring: None  Code Status: Full Code      Clinically Significant Risk Factors Present on Admission                       "       # Obesity: Estimated body mass index is 31.7 kg/m  as calculated from the following:    Height as of this encounter: 1.575 m (5' 2\").    Weight as of this encounter: 78.6 kg (173 lb 4.8 oz).              Social Drivers of Health            Disposition Plan     Medically Ready for Discharge: Anticipated Tomorrow             Alvarado Majano DO  Hospitalist Service  [facility]  Securely message with [employer] (more info)  Text page via [employer] Paging/Directory   ______________________________________________________________________    Interval History   Patient seen and examined.  No acute events over night.  No fevers or hypoxia.  Patient states her headache and ear pain are significantly improved.  No new neurologic symptoms.  No chest pain.  No nausea or vomiting.      Physical Exam   Vital Signs: Temp: 97.3  F (36.3  C) Temp src: Oral BP: 102/88 Pulse: 91   Resp: 18 SpO2: 96 % O2 Device: None (Room air)    Weight: 173 lbs 4.8 oz    General Appearance: Resting comfortably. NAD  Respiratory: No respiratory distress  Cardiovascular: RRR  GI: Non-distended  Skin: No obvious rashes or cyanosis  Other: Alert.  No edema      Medical Decision Making       55 MINUTES SPENT BY ME on the date of service doing chart review, history, exam, documentation & further activities per the note.      Data   ------------------------- PAST 24 HR DATA REVIEWED -----------------------------------------------        Imaging results reviewed over the past 24 hrs:   Recent Results (from the past 24 hours)   XR Chest Port 1 View    Narrative    CHEST ONE VIEW  12/10/2024 3:00 PM     HISTORY: Meningitis, viral source evaluation    COMPARISON: Chest radiograph 9/19/2016      Impression    IMPRESSION: No focal consolidation, pleural effusion or pneumothorax.  Cardiomediastinal silhouette is unremarkable.    KAYLAH CARDENAS MD         SYSTEM ID:  [id number]     "

## 2024-12-11 NOTE — PLAN OF CARE
Summary: [id number] 12/10/24 Severe intractable headache on chronic migraine secondary to suspected viral meningitis   R ear pain w/out drainage  Orientation: A/Ox4  Activity Level: ind  Fall Risk: no  Behavior & Aggression Tool Color: green  Pain Management: 3/10 R head and ear pain  ABNL VS/O2: VSS on RA  ABNL Lab/BG: positive for Varicella zoster, cytology test was not performed due to lack of CSF fluid   Diet: reg  Bowel/Bladder: continent   Drains/Devices: PIV saline locked  Skin: WDL   Tests/Procedures for next shift: n/a  Anticipated DC date: TBD  Other Important Info:

## 2024-12-11 NOTE — CONSULTS
[facility]    Infectious Disease Consultation     Date of Admission:  12/7/2024  Date of Consult (When I saw the patient): 12/11/24    Assessment:  44YF with history of migraine headaches, who has been admitted due to headache and right ear pain, without fever or leukocytosis and has been found to have aseptic meningitis related to varicella zoster. Ear pain is likely due to neuralgia, no clear zoster opticus of [place] hunt syndrome in the absence of vesicular rash or facial palsy.      -Aseptic meningitis related to varicella zoster. CSF assessment shows lymphocytic pleocytosis with elevated protein, glucose is lower than what is usually seen with aseptic meningitis  -No obvious Zoster opticus or [place] hunt syndrome in the absence of vesicular rash or facial palsy  -Empty sella arachnoid granulation in mid left transverse sinus, mild-moderate R transverse sinus stenosis noted on CT head    Recommendations:  Discontinue Vancomycin and Ceftriaxone  Treat with Acyclovir  Follow clinical status and labs  Likely transition to oral Valtrex by tomorrow    Tammy Hernandez MD    Reason for Consult   Reason for consult: I was asked to evaluate this patient for aseptic meningitis.    Primary Care Physician   [facility]    Chief Complaint   Headache and right ear pain    History is obtained from the patient and medical records    History of Present Illness   Nurys Soria is a 44 year old female who presents with headache and right ear pain. Patient was seen with the help of a Latvian speaking  via video.      Patient has history of migraine headaches, she has had a throbbing headache for a week , had fever for one day, no neck stiffness or confusion. Right ear pain started just a few days ago, no drainage or visible rash.    She has remained afebrile and has no leukocytosis. CSF evaluation showed lymphocytic pleocytosis with elevated protein and significantly low  glucose and meningitis panel was positive for varicella zoster establishing a diagnosis of zoster associated aseptic meningitis with neuralgia as cause for ear pain.    She feels better today on Acyclovir. She has also been initiated on ceftriaxone and vancomycin and ID has been asked to assist with further management  .  Past Medical History   I have reviewed this patient's medical history and updated it with pertinent information if needed.   No past medical history on file.    Past Surgical History   I have reviewed this patient's surgical history and updated it with pertinent information if needed.  Past Surgical History:   Procedure Laterality Date    GYN SURGERY      tubaligation\       Prior to Admission Medications   Prior to Admission Medications   Prescriptions Last Dose Informant Patient Reported? Taking?   cefdinir (OMNICEF) 300 MG capsule 12/7/2024 Morning  No Yes   Sig: Take 1 capsule (300 mg) by mouth 2 times daily for 7 days.   ondansetron (ZOFRAN ODT) 8 MG ODT tab Past Week  No Yes   Sig: Take 1 tablet (8 mg) by mouth every 8 hours as needed for nausea.      Facility-Administered Medications: None     Allergies   Allergies   Allergen Reactions    Nka [No Known Allergies]        Immunization History   Immunization History   Administered Date(s) Administered    COVID-19 Monovalent 18+ (Moderna) 03/04/2021       Social History   I have reviewed this patient's social history and updated it with pertinent information if needed. Nurys DESTINI Soria  reports that she has never smoked. She has never used smokeless tobacco. She reports that she does not drink alcohol.    Family History   I have reviewed this patient's family history and updated it with pertinent information if needed.   No family history on file.    Review of Systems   The 10 point Review of Systems is as per HPI    Physical Exam   Temp: 97.3  F (36.3  C) Temp src: Oral BP: 102/88 Pulse: 91   Resp: 18 SpO2: 96 % O2 Device: None (Room air)     Vital Signs with Ranges  Temp:  [97.3  F (36.3  C)-99.2  F (37.3  C)] 97.3  F (36.3  C)  Pulse:  [76-95] 91  Resp:  [18] 18  BP: ()/(62-88) 102/88  SpO2:  [96 %-98 %] 96 %  173 lbs 4.8 oz  Body mass index is 31.7 kg/m .    GENERAL APPEARANCE:  awake  EYES: Eyes grossly normal to inspection  HEENT : no vesicular lesions noted  NECK: no adenopathy  RESP: lungs clear   CV: regular rates and rhythm  LYMPHATICS: normal ant/post cervical and supraclavicular nodes  ABDOMEN: soft, nontender  MS: extremities normal  SKIN: no suspicious lesions or rashes        Data   All laboratory data reviewed  Component      Latest Ref [facility] 12/10/2024  10:31 AM 12/10/2024  10:32 AM   Tube Number  4    Color CSF      Colorless   Colorless    Appearance CSF      Clear   Clear    Total Nucleated Cells      0 - 5 /uL  430 (H)    RBC CSF      0 - 2 /uL  5 (H)    % Neutrophils CSF      %  3 (P)   % Lymphocytes CSF      %  90 (P)   % Mono/Macros CSF      %  6 (P)   % Eosinophils CSF      %  1 (P)   Cryptococcal Antigen Test      Negative      Cryptococcal titer interpretation     Cryptococcal Antigen Specimen Type     Glucose CSF      40 - 70 mg/dL 37 (LL)     Protein total CSF      15.0 - 45.0 mg/dL 115.4 (H)        Component      Latest Ref [facility] 12/10/2024  10:33 AM   Cryptococcal Antigen Test      Negative  Negative    Cryptococcal titer interpretation --    Cryptococcal Antigen Specimen Type Cerebrospinal fluid      Component      Latest Ref [facility] 12/10/2024  3:14 PM 12/10/2024  3:56 PM   Cryptococcal Antigen Test      Negative  Negative     Cryptococcal titer interpretation --     Cryptococcal Antigen Specimen Type Blood     HIV Antigen Antibody Combo      Nonreactive   Nonreactive        Component      Latest Ref [facility] 12/10/2024  10:32 AM   Escherichia coli K1      Negative  Negative    Haemophilus influenzae      Negative  Negative    Listeria monocytogenes      Negative  Negative    Neisseria meningitidis      Negative  Negative     Streptococcus agalactiae (GBS)      Negative  Negative    Streptococcus pneumoniae      Negative  Negative    Cytomegalovirus      Negative  Negative    Enterovirus by PCR      Negative  Negative    Herpes Simplex Virus 1      Negative  Negative    Herpes Simplex Virus 2      Negative  Negative    Human Herpes Virus 6      Negative  Negative    Human Parechovirus      Negative  Negative    Varicella Zoster Virus      Negative  Positive !    Cryptococcus neoformans/gattii      Negative  Negative       Component      Latest Ref Rng 12/8/2024  8:33 AM   Sodium      135 - 145 mmol/L 138    Potassium      3.4 - 5.3 mmol/L 4.1    Chloride      98 - 107 mmol/L 108 (H)    Carbon Dioxide (CO2)      22 - 29 mmol/L 22    Anion Gap      7 - 15 mmol/L 8    Urea Nitrogen      6.0 - 20.0 mg/dL 8.0    Creatinine      0.51 - 0.95 mg/dL 0.63    GFR Estimate      >60 mL/min/1.73m2 >90    Calcium      8.8 - 10.4 mg/dL 8.5 (L)    Glucose      70 - 99 mg/dL 107 (H)    WBC      4.0 - 11.0 10e3/uL 10.8    RBC Count      3.80 - 5.20 10e6/uL 4.72    Hemoglobin      11.7 - 15.7 g/dL 13.1    Hematocrit      35.0 - 47.0 % 38.6    MCV      78 - 100 fL 82    MCH      26.5 - 33.0 pg 27.8    MCHC      31.5 - 36.5 g/dL 33.9    RDW      10.0 - 15.0 % 13.0    Platelet Count      150 - 450 10e3/uL 277    Protein Total      6.4 - 8.3 g/dL 6.0 (L)    Albumin      3.5 - 5.2 g/dL 3.6    Bilirubin Total      <=1.2 mg/dL 0.4    Alkaline Phosphatase      40 - 150 U/L 81    AST      0 - 45 U/L 16    ALT      0 - 50 U/L 12    Bilirubin Direct      0.00 - 0.30 mg/dL <0.20       Component      Latest Ref Rng 12/7/2024  8:23 PM   Influenza A      Negative  Negative    Influenza B      Negative  Negative    Resp Syncytial Virus      Negative  Negative    SARS CoV2 PCR      Negative  Negative      Imaging  EXAM: MR BRAIN W/O and W CONTRAST  LOCATION: [facility]  DATE: 12/8/2024     INDICATION: Intractable headache, right ear  pain  COMPARISON: 12/7/2024  CONTRAST: mL Gadavist   TECHNIQUE: Routine multiplanar multisequence head MRI without and with intravenous contrast.     FINDINGS:  INTRACRANIAL CONTENTS: No acute or subacute infarct. No mass, acute hemorrhage, or extra-axial fluid collections. Normal brain parenchymal signal. Normal ventricles and sulci. Normal position of the cerebellar tonsils. No pathologic contrast enhancement.     SELLA: Empty sella morphology with flattening of the pituitary gland along the sellar floor.     OSSEOUS STRUCTURES/SOFT TISSUES: Normal marrow signal. The major intracranial vascular flow voids are maintained.      ORBITS: No abnormality accounting for technique.      SINUSES/MASTOIDS: No paranasal sinus mucosal disease. No middle ear or mastoid effusion.                                                                       IMPRESSION:  1.  No acute findings.  2.  Empty sella morphology with flattening of the pituitary gland along the sellar floor. This is a nonspecific finding but can be seen in the setting of idiopathic intracranial hypertension.  3.  Otherwise unremarkable.

## 2024-12-11 NOTE — SIGNIFICANT EVENT
At 1900, was notified by bedside RN of encephalitis panel returning positive for Zoster. This is a bit peculiar as zoster meningitis typically does not occur in an immunocompetent person. Nonetheless, will continue acyclovir 8mg/kg q8h IV for now. Given that there is no rash (per chart review as well as RN inspection), will just proceed with standard precautions    Etienne Alberto DO  [facility]ist

## 2024-12-12 VITALS
RESPIRATION RATE: 16 BRPM | WEIGHT: 173.3 LBS | BODY MASS INDEX: 31.89 KG/M2 | SYSTOLIC BLOOD PRESSURE: 95 MMHG | HEIGHT: 62 IN | DIASTOLIC BLOOD PRESSURE: 56 MMHG | TEMPERATURE: 98.7 F | HEART RATE: 66 BPM | OXYGEN SATURATION: 99 %

## 2024-12-12 LAB
BACTERIA BLD CULT: NORMAL
BACTERIA BLD CULT: NORMAL
BACTERIA CSF CULT: NORMAL
BACTERIA CSF CULT: NORMAL
EOSINOPHIL NFR CSF MANUAL: 1 %
GRAM STAIN RESULT: NORMAL
GRAM STAIN RESULT: NORMAL
LYMPH ABN NFR CSF MANUAL: 90 %
MONOS+MACROS NFR CSF MANUAL: 6 %
NEUTROPHILS NFR CSF MANUAL: 3 %
PATH REV: NORMAL
T GONDII IGG SER-ACNC: <3 IU/ML
T GONDII IGM SER-ACNC: <3 AU/ML
VDRL CSF QL: NON REACTIVE

## 2024-12-12 PROCEDURE — 250N000011 HC RX IP 250 OP 636: Performed by: PHYSICIAN ASSISTANT

## 2024-12-12 PROCEDURE — 258N000003 HC RX IP 258 OP 636: Performed by: PHYSICIAN ASSISTANT

## 2024-12-12 PROCEDURE — 250N000013 HC RX MED GY IP 250 OP 250 PS 637: Performed by: STUDENT IN AN ORGANIZED HEALTH CARE EDUCATION/TRAINING PROGRAM

## 2024-12-12 PROCEDURE — 99232 SBSQ HOSP IP/OBS MODERATE 35: CPT | Performed by: INTERNAL MEDICINE

## 2024-12-12 PROCEDURE — 120N000001 HC R&B MED SURG/OB

## 2024-12-12 PROCEDURE — 99233 SBSQ HOSP IP/OBS HIGH 50: CPT | Performed by: INTERNAL MEDICINE

## 2024-12-12 PROCEDURE — 250N000013 HC RX MED GY IP 250 OP 250 PS 637: Performed by: NURSE PRACTITIONER

## 2024-12-12 RX ADMIN — ACETAMINOPHEN 650 MG: 325 TABLET, FILM COATED ORAL at 16:54

## 2024-12-12 RX ADMIN — ACYCLOVIR SODIUM 600 MG: 50 INJECTION, SOLUTION INTRAVENOUS at 07:54

## 2024-12-12 RX ADMIN — ACETAMINOPHEN 650 MG: 325 TABLET, FILM COATED ORAL at 21:41

## 2024-12-12 RX ADMIN — METHOCARBAMOL 750 MG: 750 TABLET ORAL at 21:41

## 2024-12-12 RX ADMIN — ACETAMINOPHEN 650 MG: 325 TABLET, FILM COATED ORAL at 08:27

## 2024-12-12 RX ADMIN — METHOCARBAMOL 750 MG: 750 TABLET ORAL at 08:27

## 2024-12-12 RX ADMIN — METHOCARBAMOL 750 MG: 750 TABLET ORAL at 16:52

## 2024-12-12 RX ADMIN — ACYCLOVIR SODIUM 600 MG: 50 INJECTION, SOLUTION INTRAVENOUS at 16:51

## 2024-12-12 ASSESSMENT — ACTIVITIES OF DAILY LIVING (ADL)
ADLS_ACUITY_SCORE: 43

## 2024-12-12 NOTE — PLAN OF CARE
Date & Time: 12/11/24 0700-1930  Summary: Aseptic meningitis 2/2 Varicella Zoster  Behavior & Aggression: Green  Fall Risk: N/A  Orientation: AOx4  ABNL VS/O2: VSS on RA  ABNL Labs: See Results  Pain Management: PRNs available  Bowel/Bladder: Up to Bathroom  Drains: PIV SL  Wounds/incisions: N/A  Diet: Regular  Activity Level: IND  Tests/Procedures: N/A  Anticipated  DC Date: 12/12/24  Significant Information: Headaches have improved with IV acyclovir. ID said could transition to PO Valtrex in coming days.

## 2024-12-12 NOTE — PROGRESS NOTES
[facility]    Medicine Progress Note - Hospitalist Service    Date of Admission:  12/7/2024    Assessment & Plan   Nurys Soria is a 44 year old female with history of iron deficiency anemia, chronic migraine who was admitted on 12/7/2024 with intractable headache. Since admission, patient has been seen by neurology noting migraine history. Recommendation from neurology for spinal for further evaluation based on imaging notable for empty sella. 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 Idiopathic intracranial hypertension  MRV completed on 12/8 stating  Smooth narrowing of the lateral transverse sinuses bilaterally. This can be seen in the setting of idiopathic intracranial hypertension. No thrombosis  Underwent LP on 12/10 due continued headache and consistent with meningitis.  PCR panel positive for Varicella Zosyer   - PRN analgesics   - Vancomycin and Ceftriaxone initially started.  Discontinued   - Acyclovir started and will continue  - Neurology following and appreciate their recommendations   - ID following and appreciate their recommendations as well    - Stopped antibiotics    - Continue IV Acyclovir for now given recurrence of headache     Recent E coli UTI   Diagnosed 12/4/24 with UTI and sent home on antibiotics.  UA from 12/4 showed positive nitrates trace leukocyte Estrace, urine culture grew greater than 100,000 E. coli sensitive to cephalosporins.   - Completed course of antibiotics      Hx of iron deficiency anemia   Hgb is normal at 13.1 with MCV 82.  Not on supplements     FMLA paperwork filled out per patient request           Diet: Regular Diet Adult    DVT Prophylaxis: Pneumatic Compression Devices  Kidd Catheter: Not present  Lines: None     Cardiac Monitoring: None  Code Status: Full Code  "     Clinically Significant Risk Factors                              # Obesity: Estimated body mass index is 31.7 kg/m  as calculated from the following:    Height as of this encounter: 1.575 m (5' 2\").    Weight as of this encounter: 78.6 kg (173 lb 4.8 oz)., PRESENT ON ADMISSION            Social Drivers of Health            Disposition Plan     Medically Ready for Discharge: Anticipated Tomorrow             Alvarado Majano DO  Hospitalist Service  [facility]  Securely message with [employer] (more info)  Text page via [employer] Paging/Directory   ______________________________________________________________________    Interval History   Patient seen and examined.  No acute events over night. Patient does note her headache has returned but it is less severe.  No fevers or new neurologic symptoms. No nausea or vomiting.      Physical Exam   Vital Signs: Temp: 98.7  F (37.1  C) Temp src: Oral BP: 98/55 Pulse: 69   Resp: 16 SpO2: 98 % O2 Device: None (Room air)    Weight: 173 lbs 4.8 oz    General Appearance: Resting comfortably. NAD  Respiratory: No respiratory distress  Cardiovascular: RRR  GI: Non-distended  Skin: No obvious rashes or cyanosis to exposed skin  Other: Alert.  No edema      Medical Decision Making       60 MINUTES SPENT BY ME on the date of service doing chart review, history, exam, documentation & further activities per the note.      Data   ------------------------- PAST 24 HR DATA REVIEWED -----------------------------------------------        Imaging results reviewed over the past 24 hrs:   No results found for this or any previous visit (from the past 24 hours).  "

## 2024-12-12 NOTE — PROGRESS NOTES
Care Management Follow Up    Length of Stay (days): 1    Expected Discharge Date: 12/13/2024     Concerns to be Addressed:       Patient plan of care discussed at interdisciplinary rounds: Yes    Anticipated Discharge Disposition:                Anticipated Discharge Services:    Anticipated Discharge DME:      Patient/family educated on Medicare website which has current facility and service quality ratings:    Education Provided on the Discharge Plan:    Patient/Family in Agreement with the Plan:      Referrals Placed by CM/SW:    Private pay costs discussed: Not applicable    Discussed  Partnership in Safe Discharge Planning  document with patient/family: Yes:      Handoff Completed: No, handoff not indicated or clinically appropriate    Additional Information:  SW was able to help complete FMLA and received signed paperwork from doctor. SW delivered signed paperwork to patient so she is medically excused from work until she is able to return.     Next Steps: No further care management intervention anticipated at this time.  Please re-consult if further needs arise.  Care management signing off.       TORSTEN Roe

## 2024-12-12 NOTE — PROGRESS NOTES
Orientations: A&O x4  Vitals/Pain: VSS on RA, denies pain  Lines/Drains: IV SL  Skin/Wounds: intact  GI/: continent  Labs: Abnormal/Trends, Electrolyte Replacement- NA  Ambulation/Assist: independent  Plan: possibly switch to oral Valtrex tomorrow, then discharge

## 2024-12-12 NOTE — PROGRESS NOTES
[facility]    Infectious Disease Progress Note    Date of Service (when I saw the patient): 12/12/2024     Assessment & Plan   Nurys Soria is a 44 year old female who was admitted on 12/7/2024.     Assessment:  44YF with history of migraine headaches, who has been admitted due to headache and right ear pain, without fever or leukocytosis and has been found to have aseptic meningitis related to varicella zoster. Ear pain is likely due to neuralgia, no clear zoster opticus of Esthela hunt syndrome in the absence of vesicular rash or facial palsy.        -Aseptic meningitis related to varicella zoster. 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, mild-moderate R transverse sinus stenosis noted on CT head     Recommendations:  On  Acyclovir continue for now inpatient  Was improving but headache again this morning  Can be switched to PO valtrex, once clinically improved, 1 gm TID for 14 days       Julia Ram MD    Interval History   Tolerating antibiotics ok   No new rashes or issues with antibiotics   Labs reviewed   No changes to past medical, social or family history   Headache back this morning  On exam no vesicles in the ear      Physical Exam   Temp: 98.7  F (37.1  C) Temp src: Oral BP: 98/55 Pulse: 69   Resp: 16 SpO2: 98 % O2 Device: None (Room air)    Vitals:    12/08/24 0121   Weight: 78.6 kg (173 lb 4.8 oz)     Vital Signs with Ranges  Temp:  [98.7  F (37.1  C)] 98.7  F (37.1  C)  Pulse:  [69-96] 69  Resp:  [15-16] 16  BP: ()/(52-69) 98/55  SpO2:  [98 %-100 %] 98 %    Constitutional: Awake, alert, cooperative, no apparent distress  Lungs: Clear to auscultation bilaterally, no crackles or wheezing  Cardiovascular:  "Regular rate and rhythm, normal S1 and S2, and no murmur noted  Abdomen: Normal bowel sounds, soft, non-distended, non-tender  Skin: No rashes, no cyanosis, no edema  Other: No vesicles in the right ear    Medications   Current Facility-Administered Medications   Medication Dose Route Frequency Provider Last Rate Last Admin     Current Facility-Administered Medications   Medication Dose Route Frequency Provider Last Rate Last Admin    acyclovir (ZOVIRAX) 600 mg in sodium chloride 0.9 % 112 mL intermittent infusion  10 mg/kg (Adjusted) Intravenous Q8H Satnam Ellis PA-C 112 mL/hr at 12/12/24 0754 600 mg at 12/12/24 0754    methocarbamol (ROBAXIN) tablet 750 mg  750 mg Oral TID Naomi Severino APRN CNP   750 mg at 12/12/24 0827       Data   All microbiology laboratory data reviewed.  Recent Labs   Lab Test 12/08/24  0833 12/07/24  1728 12/05/24  2053   WBC 10.8 10.3 8.3   HGB 13.1 13.8 14.4   HCT 38.6 42.7 42.9   MCV 82 83 83    291 300     Recent Labs   Lab Test 12/09/24  0652 12/08/24  0833 12/07/24  1728   CR 0.87 0.63 0.86     No lab results found.  No lab results found.    Invalid input(s): \"UC\"    All cultures:  Recent Labs   Lab 12/10/24  1512 12/10/24  1033   CULTURE No growth after 1 day  No growth after 1 day No growth after 1 day  No growth after 1 day      Blood culture:  Results for orders placed or performed during the hospital encounter of 12/07/24   Blood Culture Arm, Left    Collection Time: 12/10/24  3:12 PM    Specimen: Arm, Left; Blood   Result Value Ref Range    Culture No growth after 1 day    Blood Culture Arm, Left    Collection Time: 12/10/24  3:12 PM    Specimen: Arm, Left; Blood   Result Value Ref Range    Culture No growth after 1 day       Urine culture:  Results for orders placed or performed in visit on 12/04/24   Urine Culture    Collection Time: 12/04/24 10:23 AM    Specimen: Urine, Clean Catch   Result Value Ref Range    Culture >100,000 CFU/mL Escherichia coli " (A)        Susceptibility    Escherichia coli - ERICA     Ampicillin >=32 Resistant ug/mL     Ampicillin/ Sulbactam >=32 Resistant ug/mL     Piperacillin/Tazobactam <=4 Susceptible ug/mL     Cefazolin* 16 Susceptible ug/mL      * Cefazolin ERICA breakpoints are for the treatment of uncomplicated urinary tract infections. For the treatment of systemic infections, please contact the laboratory for additional testing.     Cefoxitin <=4 Susceptible ug/mL     Ceftazidime <=1 Susceptible ug/mL     Ceftriaxone <=1 Susceptible ug/mL     Cefepime <=1 Susceptible ug/mL     Gentamicin <=1 Susceptible ug/mL     Tobramycin <=1 Susceptible ug/mL     Ciprofloxacin <=0.25 Susceptible ug/mL     Levofloxacin 0.5 Susceptible ug/mL     Nitrofurantoin <=16 Susceptible ug/mL     Trimethoprim/Sulfamethoxazole >16/304 Resistant ug/mL   Results for orders placed or performed during the hospital encounter of 09/19/16   Urine Culture Aerobic Bacterial    Collection Time: 09/19/16  9:52 AM   Result Value Ref Range    Specimen Description Midstream Urine     Special Requests Specimen received in preservative     Culture Micro >100,000 colonies/mL Escherichia coli (A)     Micro Report Status FINAL 09/21/2016     Organism: >100,000 colonies/mL Escherichia coli        Susceptibility    >100,000 colonies/ml escherichia coli (erica) -  (no method available)     Ampicillin >=32 Resistant  ug/mL     Cefazolin Value in next row  ug/mL      <=4 SusceptibleCefazolin ERICA breakpoints are for the treatment of uncomplicated urinary tract infections.  For the treatment of systemic infections, please contact the laboratory for additional testing.     Cefoxitin Value in next row  ug/mL      <=4 SusceptibleCefazolin ERICA breakpoints are for the treatment of uncomplicated urinary tract infections.  For the treatment of systemic infections, please contact the laboratory for additional testing.     Ceftazidime Value in next row  ug/mL      <=4 SusceptibleCefazolin ERICA  breakpoints are for the treatment of uncomplicated urinary tract infections.  For the treatment of systemic infections, please contact the laboratory for additional testing.     Ceftriaxone Value in next row  ug/mL      <=4 SusceptibleCefazolin AZAR breakpoints are for the treatment of uncomplicated urinary tract infections.  For the treatment of systemic infections, please contact the laboratory for additional testing.     Ciprofloxacin Value in next row  ug/mL      <=4 SusceptibleCefazolin AZAR breakpoints are for the treatment of uncomplicated urinary tract infections.  For the treatment of systemic infections, please contact the laboratory for additional testing.     Gentamicin Value in next row  ug/mL      <=4 SusceptibleCefazolin AZAR breakpoints are for the treatment of uncomplicated urinary tract infections.  For the treatment of systemic infections, please contact the laboratory for additional testing.     Levofloxacin Value in next row  ug/mL      <=4 SusceptibleCefazolin AZAR breakpoints are for the treatment of uncomplicated urinary tract infections.  For the treatment of systemic infections, please contact the laboratory for additional testing.     Nitrofurantoin Value in next row  ug/mL      <=4 SusceptibleCefazolin AZAR breakpoints are for the treatment of uncomplicated urinary tract infections.  For the treatment of systemic infections, please contact the laboratory for additional testing.     Tobramycin Value in next row  ug/mL      <=4 SusceptibleCefazolin AZAR breakpoints are for the treatment of uncomplicated urinary tract infections.  For the treatment of systemic infections, please contact the laboratory for additional testing.     Trimethoprim/Sulfamethoxazole Value in next row  ug/mL      <=4 SusceptibleCefazolin AZAR breakpoints are for the treatment of uncomplicated urinary tract infections.  For the treatment of systemic infections, please contact the laboratory for additional testing.      Ampicillin/Sulbactam Value in next row  ug/mL      <=4 SusceptibleCefazolin ERICA breakpoints are for the treatment of uncomplicated urinary tract infections.  For the treatment of systemic infections, please contact the laboratory for additional testing.     Piperacillin/Tazo Value in next row  ug/mL      <=4 SusceptibleCefazolin ERICA breakpoints are for the treatment of uncomplicated urinary tract infections.  For the treatment of systemic infections, please contact the laboratory for additional testing.     Cefepime Value in next row  ug/mL      <=4 SusceptibleCefazolin ERICA breakpoints are for the treatment of uncomplicated urinary tract infections.  For the treatment of systemic infections, please contact the laboratory for additional testing.   Results for orders placed or performed in visit on 08/11/09   Urine culture    Collection Time: 08/11/09  1:20 AM   Result Value Ref Range    Specimen Description Midstream Urine     Culture Micro >100,000 colonies/mL Escherichia coli     Micro Report Status FINAL 08/12/2009        Susceptibility    >100,000 colonies/ml escherichia coli (erica) -  (no method available)     Ampicillin <=2 Susceptible       Amoxicillin/Clav Susceptible       AMPICILLIN/SUBLACTAM <=2 Susceptible       Ceftriaxone <=1 Susceptible       Cefotaxime Susceptible       Cefoxitin <=4 Susceptible       Cefazolin <=4 Susceptible       Ciprofloxacin <=0.25 Susceptible       Nitrofurantoin <=16 Susceptible       Gentamicin <=1 Susceptible       Levofloxacin <=0.12 Susceptible       Piperacillin/Tazo <=4 Susceptible       Trimethoprim/Sulfamethoxazole <=1/19 Susceptible       Tobramycin <=1 Susceptible

## 2024-12-13 PROBLEM — A87.9 VIRAL MENINGITIS: Status: ACTIVE | Noted: 2024-12-13

## 2024-12-13 LAB — ACE CSF-CCNC: 2.9 U/L

## 2024-12-13 PROCEDURE — 258N000003 HC RX IP 258 OP 636: Performed by: PHYSICIAN ASSISTANT

## 2024-12-13 PROCEDURE — 99232 SBSQ HOSP IP/OBS MODERATE 35: CPT | Performed by: INTERNAL MEDICINE

## 2024-12-13 PROCEDURE — 250N000013 HC RX MED GY IP 250 OP 250 PS 637: Performed by: PSYCHIATRY & NEUROLOGY

## 2024-12-13 PROCEDURE — 250N000013 HC RX MED GY IP 250 OP 250 PS 637: Performed by: STUDENT IN AN ORGANIZED HEALTH CARE EDUCATION/TRAINING PROGRAM

## 2024-12-13 PROCEDURE — 250N000013 HC RX MED GY IP 250 OP 250 PS 637: Performed by: NURSE PRACTITIONER

## 2024-12-13 PROCEDURE — 250N000011 HC RX IP 250 OP 636: Performed by: PHYSICIAN ASSISTANT

## 2024-12-13 PROCEDURE — 120N000001 HC R&B MED SURG/OB

## 2024-12-13 RX ORDER — GABAPENTIN 100 MG/1
100 CAPSULE ORAL 2 TIMES DAILY
Status: DISCONTINUED | OUTPATIENT
Start: 2024-12-13 | End: 2024-12-14 | Stop reason: HOSPADM

## 2024-12-13 RX ADMIN — GABAPENTIN 100 MG: 100 CAPSULE ORAL at 21:35

## 2024-12-13 RX ADMIN — ACETAMINOPHEN 650 MG: 325 TABLET, FILM COATED ORAL at 18:43

## 2024-12-13 RX ADMIN — ACYCLOVIR SODIUM 600 MG: 50 INJECTION, SOLUTION INTRAVENOUS at 16:03

## 2024-12-13 RX ADMIN — ACYCLOVIR SODIUM 600 MG: 50 INJECTION, SOLUTION INTRAVENOUS at 00:32

## 2024-12-13 RX ADMIN — METHOCARBAMOL 750 MG: 750 TABLET ORAL at 09:08

## 2024-12-13 RX ADMIN — METHOCARBAMOL 750 MG: 750 TABLET ORAL at 21:35

## 2024-12-13 RX ADMIN — ACYCLOVIR SODIUM 600 MG: 50 INJECTION, SOLUTION INTRAVENOUS at 09:08

## 2024-12-13 RX ADMIN — METHOCARBAMOL 750 MG: 750 TABLET ORAL at 16:03

## 2024-12-13 ASSESSMENT — ACTIVITIES OF DAILY LIVING (ADL)
ADLS_ACUITY_SCORE: 43

## 2024-12-13 NOTE — PLAN OF CARE
Date & Time: 12/12/24 0700-1930  Summary: Aseptic meningitis 2/2 Varicella Zoster  Behavior & Aggression: Green  Fall Risk: N/A  Orientation: AOx4  ABNL VS/O2: VSS on RA  ABNL Labs: See Results  Pain Management: PRNs available  Bowel/Bladder: Up to Bathroom  Drains: PIV SL  Wounds/incisions: N/A  Diet: Regular  Activity Level: IND  Tests/Procedures: N/A  Anticipated  DC Date: 12/13/24  Significant Information: Headaches & Ear pain increased today so ID elected for pt to stay another day and get IV acyclovir. Transition to PO Valtrex in coming days.

## 2024-12-13 NOTE — PLAN OF CARE
Date & Time: 1900-0700  Summary: Aspetic meningitis 2/2 varicella zoster   Behavior & Aggression: Green   Fall Risk: No   Orientation: A&Ox4   ABNL VS/O2:VSS on RA   Pain Management:PRN tylenol x1 for headache and ear pain   Bowel/Bladder: Continent, up to BR   Drains: PIV SL   Diet:Regular, denies N/V  Activity Level: IND   Anticipated  DC Date: Possibly today?   Significant Information: Headache and ear pain stayed about the same during night. Plan to switch to PO valtrex

## 2024-12-13 NOTE — PLAN OF CARE
Goal Outcome Evaluation:      Plan of Care Reviewed With: patient    Overall Patient Progress: improvingOverall Patient Progress: improving         Shift: 12/13/24, 0537-3004  POD#/Summary: Aseptic meningitis     Orientation: A&Ox4  Vital Signs: VSS on RA  Pain Management: Scheduled robaxin  Bowel/Bladder: continent b/b  IV: PIV - SL  Diet: Regular  Activity Level: Independent   Anticipated Discharge Date/Plan: Pending tomorrow  Significant Information: Patient was going to discharge yesterday but was kept overnight due to pain, staying 1 more night. Neurology adding gabapentin for pain. Plan to discharge tomorrow.

## 2024-12-13 NOTE — PROGRESS NOTES
[facility]    Medicine Progress Note - Hospitalist Service    Date of Admission:  12/7/2024    Assessment & Plan   Nurys Soria is a 44 year old female with history of iron deficiency anemia, chronic migraine who was admitted on 12/7/2024 with intractable headache. Since admission, patient has been seen by neurology noting migraine history. Recommendation from neurology for spinal for further evaluation based on imaging notable for empty sella. 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 Idiopathic intracranial hypertension  MRV completed on 12/8 stating  Smooth narrowing of the lateral transverse sinuses bilaterally. This can be seen in the setting of idiopathic intracranial hypertension. No thrombosis  Underwent LP on 12/10 due continued headache and consistent with meningitis.  PCR panel positive for Varicella Zosyer   - PRN analgesics   - Vancomycin and Ceftriaxone initially started.  Discontinued   - Acyclovir started and will continue  - Neurology following and appreciate their recommendations   - ID following and appreciate their recommendations as well    - Stopped antibiotics    - Continue IV Acyclovir.  Switch to oral at discharge     Recent E coli UTI   Diagnosed 12/4/24 with UTI and sent home on antibiotics.  UA from 12/4 showed positive nitrates trace leukocyte Estrace, urine culture grew greater than 100,000 E. coli sensitive to cephalosporins.   - Completed course of antibiotics      Hx of iron deficiency anemia   Hgb is normal at 13.1 with MCV 82.  Not on supplements     FMLA paperwork filled out per patient request           Diet: Regular Diet Adult    DVT Prophylaxis: Pneumatic Compression Devices  Kidd Catheter: Not present  Lines: None     Cardiac Monitoring: None  Code Status: Full Code   "    Clinically Significant Risk Factors                              # Obesity: Estimated body mass index is 31.7 kg/m  as calculated from the following:    Height as of this encounter: 1.575 m (5' 2\").    Weight as of this encounter: 78.6 kg (173 lb 4.8 oz)., PRESENT ON ADMISSION            Social Drivers of Health            Disposition Plan     Medically Ready for Discharge: Anticipated Tomorrow             Alvarado Majano DO  Hospitalist Service  [facility]  Securely message with [employer] (more info)  Text page via [employer] Paging/Directory   ______________________________________________________________________    Interval History   Patient seen and examined.  No acute events over night.  Still having issues with right pain and headache.  No new neurologic symptoms.  No fevers or hypoxia.  No chest pain or SOB.  No nausea or vomiting    Physical Exam   Vital Signs: Temp: 98.1  F (36.7  C) Temp src: Oral BP: 101/60 Pulse: 62   Resp: 18 SpO2: 99 % O2 Device: None (Room air)    Weight: 173 lbs 4.8 oz    General Appearance: Resting comfortably. NAD  Respiratory: No respiratory distress  Cardiovascular: RRR   GI: Soft.  Non-distended  Skin: No obvious rashes or cyanosis  Other: Alert.  No edema      Medical Decision Making       40 MINUTES SPENT BY ME on the date of service doing chart review, history, exam, documentation & further activities per the note.      Data   ------------------------- PAST 24 HR DATA REVIEWED -----------------------------------------------        Imaging results reviewed over the past 24 hrs:   No results found for this or any previous visit (from the past 24 hours).  "

## 2024-12-13 NOTE — PROGRESS NOTES
"Neurology Progress Note.    Assessment and Plan:  This is a 44 year old woman admitted with severe intractable acute on chronic headache/migraine. Patient was seen in the ER on Dec 5, got migraine treatments and ONB which helped. Then had R ear pain, neck pain. No blurred vision. Pain was different from regular migraine. MRI showed empty sella. CTV showed Arachnoid granulation in the mid left transverse sinus and mild to moderate stenosis of the distal right transverse sinus.   Patient was treated with toradol, zofran and continued to have headache. Exam with neck tension.   Re-discussed LP to rule out IIH and patient agreed.     Opening pressures were min elevated, however, CSF lab work came back with low glucose, very high protein and elevated lymphocytes concerning for meningitis. Encephalitis/meningitis panel came back positive for zoster.    -will start on gabapentin for neuropathic pain and headache 100 mg BID  -ACE positive -however, mildly, most likely due to vital meningitis.       Thank you for allowing me to participate in cares of this patient. Please contact me with any questions of concerns (pager [phone]).     Subjective: No acute events overnight.  Today patient reported that has 3/10 pain in her right parietal region and right ear radiating down her jaw    Objective:  /60 (BP Location: Right arm)   Pulse 62   Temp 98.1  F (36.7  C) (Oral)   Resp 18   Ht 1.575 m (5' 2\")   Wt 78.6 kg (173 lb 4.8 oz)   SpO2 99%   BMI 31.70 kg/m    General: no acute distress  Neuro:  Deferred due to counseling time    I have personally reviewed all the labs and imaging studies. Pertinent findings:     Quantiferon-TB Gold Plus  Negative Negative     Toxoplasma gondii Ab, IgG  <=8.8 IU/mL <3.0     Component      Latest Ref Rng 12/10/2024  10:32 AM   ACE Angiotensin Conv Enz CSF      0.0 - 2.5 U/L 2.9 (H)       Legend:  (H) High    Total time of visit: 35 minutes with the time spent on chart review, " imaging and lab results review, and more than 50 % of the time spent on coordination of cares and face-to-face time with the patient including counseling regarding pathophysiology of the above conditions, results of the tests and further directions of care.     Corry Martin MD  [facility] Neurology

## 2024-12-14 VITALS
OXYGEN SATURATION: 99 % | HEART RATE: 64 BPM | HEIGHT: 62 IN | RESPIRATION RATE: 16 BRPM | WEIGHT: 173.3 LBS | BODY MASS INDEX: 31.89 KG/M2 | SYSTOLIC BLOOD PRESSURE: 98 MMHG | DIASTOLIC BLOOD PRESSURE: 58 MMHG | TEMPERATURE: 97.5 F

## 2024-12-14 PROCEDURE — 258N000003 HC RX IP 258 OP 636: Performed by: PHYSICIAN ASSISTANT

## 2024-12-14 PROCEDURE — 250N000013 HC RX MED GY IP 250 OP 250 PS 637: Performed by: NURSE PRACTITIONER

## 2024-12-14 PROCEDURE — 250N000011 HC RX IP 250 OP 636: Performed by: PHYSICIAN ASSISTANT

## 2024-12-14 PROCEDURE — 99239 HOSP IP/OBS DSCHRG MGMT >30: CPT | Performed by: INTERNAL MEDICINE

## 2024-12-14 PROCEDURE — 250N000013 HC RX MED GY IP 250 OP 250 PS 637: Performed by: PSYCHIATRY & NEUROLOGY

## 2024-12-14 RX ORDER — GABAPENTIN 100 MG/1
100 CAPSULE ORAL 2 TIMES DAILY
Qty: 60 CAPSULE | Refills: 0 | Status: SHIPPED | OUTPATIENT
Start: 2024-12-14 | End: 2025-01-13

## 2024-12-14 RX ORDER — METHOCARBAMOL 750 MG/1
750 TABLET, FILM COATED ORAL 3 TIMES DAILY PRN
Qty: 30 TABLET | Refills: 0 | Status: SHIPPED | OUTPATIENT
Start: 2024-12-14

## 2024-12-14 RX ORDER — VALACYCLOVIR HYDROCHLORIDE 1 G/1
1000 TABLET, FILM COATED ORAL 3 TIMES DAILY
Qty: 42 TABLET | Refills: 0 | Status: SHIPPED | OUTPATIENT
Start: 2024-12-14 | End: 2024-12-28

## 2024-12-14 RX ADMIN — GABAPENTIN 100 MG: 100 CAPSULE ORAL at 10:04

## 2024-12-14 RX ADMIN — METHOCARBAMOL 750 MG: 750 TABLET ORAL at 10:04

## 2024-12-14 RX ADMIN — ACYCLOVIR SODIUM 600 MG: 50 INJECTION, SOLUTION INTRAVENOUS at 01:12

## 2024-12-14 RX ADMIN — ACYCLOVIR SODIUM 600 MG: 50 INJECTION, SOLUTION INTRAVENOUS at 10:05

## 2024-12-14 ASSESSMENT — ACTIVITIES OF DAILY LIVING (ADL)
ADLS_ACUITY_SCORE: 43
ADLS_ACUITY_SCORE: 48
ADLS_ACUITY_SCORE: 43

## 2024-12-14 NOTE — PROGRESS NOTES
Patient discharged home with .  Discussed AVS with patient. All questions and concerns related to discharge were addressed. All patient belongings, Rx and AVS sent home with patient.     Education provided and all questions answered at this time.

## 2024-12-14 NOTE — PLAN OF CARE
Goal Outcome Evaluation:      Plan of Care Reviewed With: patient    Overall Patient Progress: improvingOverall Patient Progress: improving    Shift: 11p-7a  Surgery/POD#: Pt here for aseptic meningitis.  Behavior & Aggression: Green  Fall Risk: No  Orientation: A&O x4  ABNL VS/O2: VSS on RA   Tele: NA  ABNL Labs: NA  Pain Management: pt declined pain meds, ice packs   Bowel/Bladder: voiding in BR, bowel sounds normoactive, passing flatus, last BM 12/13  Drains: NA  Lines: PIV Sl'd, int Abx  Diet: Regular, denies N/V  Activity Level: Ind  Tests/Procedures: NA  Anticipated  DC Date: 12/14  Significant Information:   Hospitalist, Neuro & ID following. Pt will discharge with oral Abx.

## 2024-12-14 NOTE — DISCHARGE SUMMARY
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 Idiopathic intracranial hypertension  Recent E coli UTI   Hx of iron deficiency anemia       Clinically Significant Risk Factors     # Obesity: Estimated body mass index is 31.7 kg/m  as calculated from the following:    Height as of this encounter: 1.575 m (5' 2\").    Weight as of this encounter: 78.6 kg (173 lb 4.8 oz).       Follow-ups Needed After Discharge   Follow-up Appointments       Follow-up and recommended labs and tests       Follow up with primary care provider, [facility], within 1-2 weeks, for hospital follow- up. No follow up labs or test are needed.  Follow up with infectious disease and neurology as recommended              Unresulted Labs Ordered in the Past 30 Days of this Admission       Date and Time Order Name Status Description    12/10/2024  2:24 PM Blood Culture Arm, Left Preliminary     12/10/2024  2:24 PM Blood Culture Arm, Left Preliminary     12/10/2024  2:04 PM Fungal or Yeast Culture Routine Preliminary     12/9/2024  6:05 PM Cerebrospinal fluid Aerobic Bacterial Culture Routine With Gram Stain Preliminary         These results will be followed up by PCP and ID     Discharge Disposition   Discharged to home  Condition at discharge: Stable    Hospital Course   Nurys Soria is a 44 year old female with history of iron deficiency anemia, chronic migraine who was admitted on 12/7/2024 with intractable headache. Since admission, patient has been seen by neurology noting migraine history. Recommendation from neurology for spinal for further " evaluation based on imaging notable for empty sella. 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 Idiopathic intracranial hypertension  MRV completed on 12/8 stating  Smooth narrowing of the lateral transverse sinuses bilaterally. This can be seen in the setting of idiopathic intracranial hypertension. No thrombosis  Underwent LP on 12/10 due continued headache and consistent with meningitis.  PCR panel positive for Varicella Zosyer   - PRN analgesics   - Vancomycin and Ceftriaxone initially started.  Discontinued   - Acyclovir started and will continue  - Neurology following and appreciate their recommendations    - Added Neurontin   - ID following and appreciate their recommendations as well               - Stopped antibiotics                 - Continue IV Acyclovir.  Switch to oral at discharge     Recent E coli UTI   Diagnosed 12/4/24 with UTI and sent home on antibiotics.  UA from 12/4 showed positive nitrates trace leukocyte Estrace, urine culture grew greater than 100,000 E. coli sensitive to cephalosporins.   - Completed course of antibiotics      Hx of iron deficiency anemia   Hgb is normal at 13.1 with MCV 82.  Not on supplements     Consultations This Hospital Stay   NEUROLOGY IP CONSULT  INFECTIOUS DISEASES IP CONSULT  PHARMACY TO DOSE VANCO  CARE MANAGEMENT / SOCIAL WORK IP CONSULT    Code Status   Full Code    Time Spent on this Encounter   I, Alvarado Majano DO, personally saw the patient today and spent greater than 30 minutes discharging this patient.       Alvarado Majano DO  [facility]  [address] [place] [id number]  Phone: [phone]  Fax: [phone]  ______________________________________________________________________    Physical Exam   Vital Signs: Temp: 97.5  F (36.4   C) Temp src: Temporal BP: 98/58 Pulse: 64   Resp: 16 SpO2: 99 % O2 Device: None (Room air)    Weight: 173 lbs 4.8 oz  General Appearance: Resting comfortably. NAD  Respiratory: No respiratory distress  Cardiovascular: RRR  GI: Non-distended  Skin: No obvious rashes or cyanosis  Other: Alert.  No edema         Primary Care Physician   [facility]    Discharge Orders      Reason for your hospital stay    Viral meningitis     Follow-up and recommended labs and tests     Follow up with primary care provider, [facility], within 1-2 weeks, for hospital follow- up. No follow up labs or test are needed.  Follow up with infectious disease and neurology as recommended     Activity    Your activity upon discharge: activity as tolerated     Diet    Follow this diet upon discharge: Current Diet:Orders Placed This Encounter      Regular Diet Adult       Significant Results and Procedures   Most Recent 3 CBC's:  Recent Labs   Lab Test 12/08/24  0833 12/07/24  1728 12/05/24 2053   WBC 10.8 10.3 8.3   HGB 13.1 13.8 14.4   MCV 82 83 83    291 300     Most Recent 3 BMP's:  Recent Labs   Lab Test 12/10/24  1147 12/09/24  0652 12/08/24  0833 12/07/24  1728 12/05/24 2053   NA  --   --  138 140 139   POTASSIUM  --   --  4.1 3.9 3.8   CHLORIDE  --   --  108* 103 103   CO2  --   --  22 24 26   BUN  --   --  8.0 13.6 9.0   CR  --  0.87 0.63 0.86 0.88   ANIONGAP  --   --  8 13 10   CASSIE  --   --  8.5* 9.1 9.3   *  --  107* 109* 122*     7-Day Micro Results       Collected Updated Procedure Result Status      12/10/2024 1556 12/11/2024 1649 Quantiferon TB Gold Plus Grey Tube [[id number]]   Blood from Hand, Right    Final result Component Value Units   Quantiferon Nil Tube 0.10 IU/mL            12/10/2024 1556 12/11/2024 1650 Quantiferon TB Gold Plus Green Tube [[id number]]   Blood from Hand, Right    Final result Component Value Units   Quantiferon TB1 Tube 0.27 IU/mL             12/10/2024 1556 12/11/2024 1650 Quantiferon TB Gold Plus Yellow Tube [[id number]]   Blood from Hand, Right    Final result Component Value   Quantiferon TB2 Tube 0.27            12/10/2024 1556 12/11/2024 1650 Quantiferon TB Gold Plus Purple Tube [[id number]]   Blood from Hand, Right    Final result Component Value Units   Quantiferon Mitogen 10.00 IU/mL            12/10/2024 1556 12/11/2024 1718 Quantiferon TB Gold Plus [[id number]]   Blood from Hand, Right    Final result Component Value Units   Quantiferon-TB Gold Plus Negative    No interferon gamma response to M.tuberculosis antigens was detected. Infection with M.tuberculosis is unlikely, however a single negative result does not exclude infection. In patients at high risk for infection, a second test should be considered in accordance with the 2017 ATS/IDSA/CDC Clinical Pract  ice Guidelines for Diagnosis of Tuberculosis in Adults and Children    TB1 Ag minus Nil Value 0.17 IU/mL   TB2 Ag minus Nil Value 0.17 IU/mL   Mitogen minus Nil Result 9.90 IU/mL   Nil Result 0.10 IU/mL            12/10/2024 1514 12/10/2024 1831 Cryptococcus antigen [[id number]]   Blood from Arm, Right    Final result Component Value   Cryptococcal Antigen Negative   Cryptococcal titer interpretation --   Titer testing not performed due to negative/invalid result.     Cryptococcal Antigen Specimen Type Blood            12/10/2024 1512 12/13/2024 1831 Blood Culture Arm, Left [[id number]]   Blood from Arm, Left    Preliminary result Component Value   Culture No growth after 3 days  [P]                12/10/2024 1512 12/13/2024 1831 Blood Culture Arm, Left [[id number]]    Blood from Arm, Left    Preliminary result Component Value   Culture No growth after 3 days  [P]                12/10/2024 1033 12/14/2024 0715 Cerebrospinal fluid Aerobic Bacterial Culture Routine With Gram Stain [[id number]]   Cerebrospinal fluid from Spine, Lumbar    Preliminary result Component Value    Culture No growth after 3 days  [P]    Gram Stain Result No organisms seen  [P]     4+ WBC seen  [P]     Predominantly mononuclear cells    Gram Stain quantification of host cells and microbiological organisms was done on a cytocentrifuged preparation.                 12/10/2024 1033 12/13/2024 1701 Fungal or Yeast Culture Routine [[id number]]   Cerebrospinal fluid from Lumbar Puncture    Preliminary result Component Value   Culture No growth after 3 days  [P]                12/10/2024 1033 12/10/2024 1651 Cryptococcus antigen [[id number]]   Cerebrospinal fluid from Lumbar Puncture    Final result Component Value   Cryptococcal Antigen Negative   Cryptococcal titer interpretation --   Titer testing not performed due to negative/invalid result.     Cryptococcal Antigen Specimen Type Cerebrospinal fluid            12/10/2024 1032 12/12/2024 1149 CSF Cell Count with Differential: [[id number]]    (Abnormal)   Cerebrospinal fluid from Lumbar Puncture    Final result Component Value   No component results            12/10/2024 1032 12/10/2024 1230 Cell Count CSF [[id number]]   (Abnormal)   Cerebrospinal fluid from Lumbar Puncture    Final result Component Value Units   Tube Number 4    Color Colorless    Clarity Clear    Total Nucleated Cells 430 /uL   RBC Count 5 /uL            12/10/2024 1032 12/12/2024 1149 Differential CSF [[id number]]    Cerebrospinal fluid from Lumbar Puncture    Final result Component Value Units   % Neutrophils 3 %   % Lymphocytes 90 %   % Monocytes/Macrophages 6 %   % Eosinophils 1 %   Pathologist Review Comments (CSF Diff) --    Interpretation: Predominantly lymphocytes with a few reactive macrophages; consider flow cytometry to exclude a clonal lymphoid process, as clinically relevant.            12/10/2024 1032 12/10/2024 1814 Meningitis/Encephalitis Panel Qual PCR CSF: [[id number]]    (Abnormal)   Cerebrospinal fluid from Lumbar Puncture    Final result Component Value    Escherichia coli K1 Negative   Haemophilus influenzae Negative   Listeria monocytogenes Negative   Neisseria meningitidis Negative   Streptococcus agalactiae (GBS) Negative   Streptococcus pneumoniae Negative   Cytomegalovirus Negative   Enterovirus Negative   Herpes simplex virus 1 Negative   Recommend testing with another molecular method if clinical suspicion for infection is high.   Herpes simplex virus 2 Negative   Recommend testing with another molecular method if clinical suspicion for infection is high.   Human Herpes Virus 6 Negative   Human parechovirus Negative   Varicella zoster virus Positive   Cryptococcus neoformans/gattii Negative   Recommend testing for Cryptococcal antigen and fungal culture if clinical suspicion for infection is high.            12/07/2024 2023 12/07/2024 2112 Influenza A/B, RSV and SARS-CoV2 PCR (COVID-19) Nasopharyngeal [[id number]]    Swab from Nasopharyngeal    Final result Component Value   Influenza A PCR Negative   Influenza B PCR Negative   RSV PCR Negative   SARS CoV2 PCR Negative   NEGATIVE: SARS-CoV-2 (COVID-19) RNA not detected, presumed negative.                ,   Results for orders placed or performed during the hospital encounter of 12/07/24   CTA Head Neck with Contrast    Narrative    EXAM: CTA HEAD NECK W CONTRAST  LOCATION: [facility]  DATE: 12/7/2024    INDICATION: Right sided headache neck pain  COMPARISON: None.  CONTRAST: 67mL Isovue 370  TECHNIQUE: Head and neck CT angiogram with IV contrast. Noncontrast head CT followed by axial helical CT images of the head and neck vessels obtained during the arterial phase of intravenous contrast administration. Axial 2D reconstructed images and   multiplanar 3D MIP reconstructed images of the head and neck vessels were performed by the technologist. Dose reduction techniques were used. All stenosis measurements made according to NASCET criteria unless otherwise specified.    FINDINGS:    NONCONTRAST HEAD CT:   INTRACRANIAL CONTENTS: No intracranial hemorrhage, extraaxial collection, or mass effect.  No CT evidence of acute infarct. Normal parenchymal attenuation. Normal ventricles and sulci. Empty sella.    VISUALIZED ORBITS/SINUSES/MASTOIDS: No intraorbital abnormality. No paranasal sinus mucosal disease. No middle ear or mastoid effusion.    BONES/SOFT TISSUES: No acute abnormality.    HEAD CTA:  ANTERIOR CIRCULATION: No stenosis/occlusion, aneurysm, or high flow vascular malformation. Standard [place] anatomy.    POSTERIOR CIRCULATION: No stenosis/occlusion, aneurysm, or high flow vascular malformation. Dominant left and smaller right vertebral artery contribute to a normal basilar artery.     DURAL VENOUS SINUSES: Expected enhancement of the major dural venous sinuses.    NECK CTA:  RIGHT CAROTID: No measurable stenosis or dissection.    LEFT CAROTID: No measurable stenosis or dissection.    VERTEBRAL ARTERIES: No focal stenosis or dissection. Dominant left and smaller right vertebral arteries.    AORTIC ARCH: Classic aortic arch anatomy with no significant stenosis at the origin of the great vessels.    NONVASCULAR STRUCTURES: Unremarkable.      Impression    IMPRESSION:   HEAD CT:  1.  No acute process.    HEAD CTA:   1.  Normal CTA [place].    NECK CTA:  1.  Normal neck CTA.   CTV Head with Contrast    Narrative    EXAM: CTV HEAD WITH CONTRAST  LOCATION: [facility]  DATE: 12/7/2024    INDICATION: Headache dizziness, nausea  COMPARISON: None.  CONTRAST: 67mL Isovue 370  TECHNIQUE: Head CT venogram with IV contrast. Noncontrast head CT followed by axial helical CT images of the intracranial vessels obtained during the venous phase of intravenous contrast administration. Axial helical 2D reconstructed images and   multiplanar 3D MIP reconstructed images of the intracranial vessels were performed by the technologist. Dose reduction techniques were  used.     FINDINGS:   NONCONTRAST HEAD CT:   INTRACRANIAL CONTENTS: No intracranial hemorrhage, extraaxial collection, or mass effect.  No CT evidence of acute infarct. Normal parenchymal attenuation. Normal ventricles and sulci. Empty sella.    VISUALIZED ORBITS/SINUSES/MASTOIDS: No intraorbital abnormality. No paranasal sinus mucosal disease. No middle ear or mastoid effusion.    BONES/SOFT TISSUES: No acute abnormality.    HEAD CTV:  DURAL SINUSES: No occlusion. Dominant left and smaller right transverse and sigmoid dural sinuses. Mid left transverse sinus arachnoid granulation noted. Mild-to-moderate stenosis of the distal right transverse sinus.    INTERNAL CEREBRAL VEINS: No significant stenosis or occlusion.      MAJOR CORTICAL VENOUS BRANCHES: No significant stenosis or occlusion.      Impression    IMPRESSION:   HEAD CT:  1.  No acute process.    2.  Empty sella.    HEAD CTV:   1.  No evidence for dural sinus thrombosis.    2.  Arachnoid granulation in the mid left transverse sinus.    3.  Mild to moderate stenosis of the distal right transverse sinus.   Head CT w/o contrast    Narrative    EXAM: CT HEAD W/O CONTRAST  LOCATION: [facility]  DATE: 12/7/2024    INDICATION: headache, right sided  COMPARISON: None.  TECHNIQUE: Routine CT Head without IV contrast. Multiplanar reformats. Dose reduction techniques were used.    FINDINGS:  INTRACRANIAL CONTENTS: No intracranial hemorrhage, extraaxial collection, or mass effect.  No CT evidence of acute infarct. Normal parenchymal attenuation. Normal ventricles and sulci. Empty sella.    VISUALIZED ORBITS/SINUSES/MASTOIDS: No intraorbital abnormality. No paranasal sinus mucosal disease. No middle ear or mastoid effusion.    BONES/SOFT TISSUES: No acute abnormality.      Impression    IMPRESSION:  1.  No acute process.   MR Brain w/o & w Contrast    Narrative    EXAM: MR BRAIN W/O and W CONTRAST  LOCATION: [facility]  DATE: 12/8/2024    INDICATION: Intractable headache, right ear pain  COMPARISON: 12/7/2024  CONTRAST: mL Gadavist   TECHNIQUE: Routine multiplanar multisequence head MRI without and with intravenous contrast.    FINDINGS:  INTRACRANIAL CONTENTS: No acute or subacute infarct. No mass, acute hemorrhage, or extra-axial fluid collections. Normal brain parenchymal signal. Normal ventricles and sulci. Normal position of the cerebellar tonsils. No pathologic contrast enhancement.    SELLA: Empty sella morphology with flattening of the pituitary gland along the sellar floor.    OSSEOUS STRUCTURES/SOFT TISSUES: Normal marrow signal. The major intracranial vascular flow voids are maintained.     ORBITS: No abnormality accounting for technique.     SINUSES/MASTOIDS: No paranasal sinus mucosal disease. No middle ear or mastoid effusion.       Impression    IMPRESSION:  1.  No acute findings.  2.  Empty sella morphology with flattening of the pituitary gland along the sellar floor. This is a nonspecific finding but can be seen in the setting of idiopathic intracranial hypertension.  3.  Otherwise unremarkable.     MRV Brain wo & w Contrast    Narrative    EXAM: MRV BRAIN  W/O and W CONTRAST  LOCATION: [facility]  DATE: 12/8/2024    INDICATION: abnormal CTV  COMPARISON: None.  CONTRAST: 10mL marcella  TECHNIQUE:   1) Phase contrast and 2-D time-of-flight head MRV performed after administration of intravenous contrast.    FINDINGS:    HEAD MRV:   DURAL SINUSES: There is smooth narrowing of the lateral transverse sinuses bilaterally. No thrombosis. Balanced transverse and sigmoid sinuses.    INTERNAL CEREBRAL VEINS: No significant stenosis or occlusion.      MAJOR CORTICAL VENOUS BRANCHES: No significant stenosis or occlusion.      Impression    IMPRESSION:  1.  Smooth narrowing of the lateral transverse sinuses bilaterally. This can be seen in the setting of idiopathic intracranial hypertension.  2.   No thrombosis.     XR Lumbar Puncture Spinal Tap Diag    Narrative    EXAM: XR LUMBAR PUNCTURE SPINAL TAP DIAGNOSTIC  12/10/2024 10:48 AM       History:  Persistent headache, MRI with signs of intracranial  hypertension, need evaluation for Idiopathic Intracranial  Hypertension.     PROCEDURE: The patient consented for a lumbar puncture. The benefits  and risks of the procedure were explained to the patient, including  but not limited to worsening headache, hemorrhage, infection, lower  extremity pain, or nerve root injury. The patient was sterilely  prepped and draped with the patient in the supine position, over the  lower back. Under fluoroscopic guidance, the interlaminar spaces were  noted. 1% lidocaine was administered for local anesthetic over the  L2-3 interlaminar space, and a 22 gauge needle was advanced into the  thecal sac under fluoroscopic guidance.  There was initial aspiration  of clear CSF. About 12 cc's total were aspirated.  The needle was  removed. There were no immediate complications associated with the  procedure.   Estimated blood loss during the procedure was less than 5  mL.    Opening Pressure: 23 cm of water  Fluoro time: 0.1 minutes  Images Obtained: 4  Medications used: 2 mL 1% Lidocaine local      Impression    IMPRESSION: Successful lumbar puncture.    KETURAH MARTINEZ PA-C         SYSTEM ID:  [id number]   XR Chest Port 1 View    Narrative    CHEST ONE VIEW  12/10/2024 3:00 PM     HISTORY: Meningitis, viral source evaluation    COMPARISON: Chest radiograph 9/19/2016      Impression    IMPRESSION: No focal consolidation, pleural effusion or pneumothorax.  Cardiomediastinal silhouette is unremarkable.    KAYLAH CARDENAS MD         SYSTEM ID:  [id number]       Discharge Medications   Current Discharge Medication List        START taking these medications    Details   gabapentin (NEURONTIN) 100 MG capsule Take 1 capsule (100 mg) by mouth 2 times daily.  Qty: 60 capsule, Refills: 0     Comments: [facility] with phone number [phone].  Associated Diagnoses: Viral meningitis      methocarbamol (ROBAXIN) 750 MG tablet Take 1 tablet (750 mg) by mouth 3 times daily as needed for muscle spasms or other (headache).  Qty: 30 tablet, Refills: 0    Comments: Future [facility] with phone number [phone].  Associated Diagnoses: Viral meningitis      valACYclovir (VALTREX) 1000 mg tablet Take 1 tablet (1,000 mg) by mouth 3 times daily for 14 days.  Qty: 42 tablet, Refills: 0    Comments: Future refills by [facility]  [facility] with phone number [phone].  Associated Diagnoses: Viral meningitis           CONTINUE these medications which have NOT CHANGED    Details   ondansetron (ZOFRAN ODT) 8 MG ODT tab Take 1 tablet (8 mg) by mouth every 8 hours as needed for nausea.  Qty: 21 tablet, Refills: 0    Associated Diagnoses: Nausea           STOP taking these medications       cefdinir (OMNICEF) 300 MG capsule Comments:   Reason for Stopping:             Allergies   Allergies   Allergen Reactions    Nka [No Known Allergies]

## 2024-12-14 NOTE — PLAN OF CARE
Goal Outcome Evaluation:         Date & Time: 12/13/24 3163-2119  Surgery/POD#: Aseptic Meningitis   Behavior & Aggression: Green   Fall Risk: No  Orientation: A&Ox4   ABNL VS/O2: VSS on RA   ABNL Labs: See chart   Pain Management: PRN Tylenol   Bowel/Bladder: Cont of B&B  Drains: PIV SL   Diet: Reg  Activity Level: Indep  Tests/Procedures: NA   Anticipated  DC Date: Pending   Significant Information: Headache & ear pain. Pt reports no relief of pain during shift.

## 2024-12-15 LAB
BACTERIA BLD CULT: NO GROWTH
BACTERIA BLD CULT: NO GROWTH
BACTERIA CSF CULT: NO GROWTH
GRAM STAIN RESULT: NORMAL
GRAM STAIN RESULT: NORMAL

## 2024-12-16 ENCOUNTER — PATIENT OUTREACH (OUTPATIENT)
Dept: CARE COORDINATION | Facility: CLINIC | Age: 44
End: 2024-12-16
Payer: COMMERCIAL

## 2024-12-16 NOTE — PROGRESS NOTES
"  [facility]: Transitions of Care Outreach  Chief Complaint   Patient presents with    Clinic Care Coordination - Post Hospital       Most Recent Admission Date: 12/7/2024   Most Recent Admission Diagnosis: Abnormal CT of the head - R93.0  Intractable headache, unspecified chronicity pattern, unspecified headache type - R51.9     Most Recent Discharge Date: 12/14/2024   Most Recent Discharge Diagnosis: Intractable headache, unspecified chronicity pattern, unspecified headache type - R51.9  Abnormal CT of the head - R93.0  Viral meningitis - A87.9     Transitions of Care Assessment    Discharge Assessment  How are you doing now that you are home?: \" I am doing good , thanks\"  How are your symptoms? (Red Flag symptoms escalate to triage hotline per guidelines): Improved  Do you know how to contact your clinic care team if you have future questions or changes to your health status? : Yes  Does the patient have their discharge instructions? : Yes  Does the patient have questions regarding their discharge instructions? : No  Were you started on any new medications or were there changes to any of your previous medications? : Yes  Does the patient have all of their medications?: Yes  Do you have questions regarding any of your medications? : No  Do you have all of your needed medical supplies or equipment (DME)?  (i.e. oxygen tank, CPAP, cane, etc.): Yes    Post-op ([facility] CTA Only)  If the patient had a surgery or procedure, do they have any questions for a nurse?: No           Follow up Plan     Discharge Follow-Up  Discharge follow up appointment scheduled in alignment with recommended follow up timeframe or Transitions of Risk Category? (Low = within 30 days; Moderate= within 14 days; High= within 7 days): No  Patient's follow up appointment not scheduled: Patient declined scheduling support. Education on the importance of transitions of care follow up. Provided scheduling phone number.    No future " appointments.    Outpatient Plan as outlined on AVS reviewed with patient.    For any urgent concerns, please contact our 24 hour nurse triage line: [phone] ([phone])       DUSTIN Tatum

## 2024-12-19 LAB — BACTERIA CSF CULT: NORMAL

## 2024-12-19 NOTE — PLAN OF CARE
Goal Outcome Evaluation:      Plan of Care Reviewed With: patient    Overall Patient Progress: improvingOverall Patient Progress: improving       Date & Time: 12/11/24-12/12/24 [id number]  Summary: Aseptic meningitis 2/2 Varicella Zoster  Behavior & Aggression: Green  Fall Risk: N/A  Orientation: AOx4  ABNL VS/O2: VSS on RA  ABNL Labs: See Results  Pain Management: PRNs available, reports minimal headache.   Bowel/Bladder: Up to Bathroom  Drains: PIV SL with intermittent abx.  Wounds/incisions: N/A  Diet: Regular  Activity Level: IND  Tests/Procedures: N/A  Anticipated  DC Date: 12/12/24  Significant Information: Headaches have improved with IV acyclovir. ID said could transition to PO Valtrex in coming days.             [phone]

## 2024-12-20 ENCOUNTER — APPOINTMENT (OUTPATIENT)
Dept: INTERPRETER SERVICES | Facility: CLINIC | Age: 44
End: 2024-12-20
Payer: COMMERCIAL

## 2024-12-26 LAB — BACTERIA CSF CULT: NORMAL

## 2025-01-02 LAB — BACTERIA CSF CULT: NORMAL

## 2025-01-07 LAB — BACTERIA CSF CULT: NO GROWTH

## (undated) RX ORDER — LIDOCAINE HYDROCHLORIDE 10 MG/ML
INJECTION, SOLUTION EPIDURAL; INFILTRATION; INTRACAUDAL; PERINEURAL
Status: DISPENSED
Start: 2024-12-10